# Patient Record
Sex: MALE | Race: BLACK OR AFRICAN AMERICAN | NOT HISPANIC OR LATINO
[De-identification: names, ages, dates, MRNs, and addresses within clinical notes are randomized per-mention and may not be internally consistent; named-entity substitution may affect disease eponyms.]

---

## 2017-01-05 ENCOUNTER — RX RENEWAL (OUTPATIENT)
Age: 57
End: 2017-01-05

## 2017-01-06 ENCOUNTER — APPOINTMENT (OUTPATIENT)
Dept: PULMONOLOGY | Facility: CLINIC | Age: 57
End: 2017-01-06

## 2017-01-06 VITALS
HEART RATE: 56 BPM | WEIGHT: 238 LBS | SYSTOLIC BLOOD PRESSURE: 136 MMHG | BODY MASS INDEX: 33.32 KG/M2 | HEIGHT: 71 IN | DIASTOLIC BLOOD PRESSURE: 82 MMHG | OXYGEN SATURATION: 96 % | TEMPERATURE: 98.4 F

## 2017-04-24 ENCOUNTER — APPOINTMENT (OUTPATIENT)
Dept: PULMONOLOGY | Facility: CLINIC | Age: 57
End: 2017-04-24

## 2017-04-24 VITALS
BODY MASS INDEX: 33.32 KG/M2 | WEIGHT: 238 LBS | SYSTOLIC BLOOD PRESSURE: 136 MMHG | OXYGEN SATURATION: 95 % | HEIGHT: 71 IN | HEART RATE: 60 BPM | DIASTOLIC BLOOD PRESSURE: 80 MMHG

## 2017-04-24 RX ORDER — DOXYCYCLINE 100 MG/1
100 CAPSULE ORAL
Qty: 14 | Refills: 1 | Status: DISCONTINUED | COMMUNITY
Start: 2017-01-06 | End: 2017-04-24

## 2018-01-05 ENCOUNTER — APPOINTMENT (OUTPATIENT)
Dept: PULMONOLOGY | Facility: CLINIC | Age: 58
End: 2018-01-05
Payer: COMMERCIAL

## 2018-01-05 VITALS
RESPIRATION RATE: 16 BRPM | OXYGEN SATURATION: 91 % | HEIGHT: 71 IN | HEART RATE: 78 BPM | WEIGHT: 245 LBS | DIASTOLIC BLOOD PRESSURE: 120 MMHG | BODY MASS INDEX: 34.3 KG/M2 | SYSTOLIC BLOOD PRESSURE: 156 MMHG | TEMPERATURE: 97.4 F

## 2018-01-05 VITALS
DIASTOLIC BLOOD PRESSURE: 104 MMHG | SYSTOLIC BLOOD PRESSURE: 148 MMHG | OXYGEN SATURATION: 94 % | RESPIRATION RATE: 18 BRPM | HEART RATE: 70 BPM

## 2018-01-05 DIAGNOSIS — R05 COUGH: ICD-10-CM

## 2018-01-05 PROCEDURE — 99214 OFFICE O/P EST MOD 30 MIN: CPT

## 2018-02-19 ENCOUNTER — APPOINTMENT (OUTPATIENT)
Dept: PULMONOLOGY | Facility: CLINIC | Age: 58
End: 2018-02-19
Payer: COMMERCIAL

## 2018-02-19 VITALS
TEMPERATURE: 98.6 F | HEART RATE: 64 BPM | RESPIRATION RATE: 16 BRPM | HEIGHT: 71 IN | OXYGEN SATURATION: 97 % | BODY MASS INDEX: 34.44 KG/M2 | WEIGHT: 246 LBS | DIASTOLIC BLOOD PRESSURE: 94 MMHG | SYSTOLIC BLOOD PRESSURE: 134 MMHG

## 2018-02-19 PROCEDURE — 99215 OFFICE O/P EST HI 40 MIN: CPT | Mod: 25

## 2018-02-19 PROCEDURE — 94729 DIFFUSING CAPACITY: CPT

## 2018-02-19 PROCEDURE — 94727 GAS DIL/WSHOT DETER LNG VOL: CPT

## 2018-02-19 PROCEDURE — 94060 EVALUATION OF WHEEZING: CPT

## 2018-02-19 RX ORDER — AZITHROMYCIN 250 MG/1
250 TABLET, FILM COATED ORAL
Qty: 6 | Refills: 0 | Status: COMPLETED | COMMUNITY
Start: 2017-12-23

## 2018-02-19 RX ORDER — LATANOPROST/PF 0.005 %
0.01 DROPS OPHTHALMIC (EYE)
Qty: 2 | Refills: 0 | Status: ACTIVE | COMMUNITY
Start: 2017-09-23

## 2018-02-19 RX ORDER — PREDNISONE 10 MG/1
10 TABLET ORAL
Qty: 60 | Refills: 0 | Status: COMPLETED | COMMUNITY
Start: 2017-01-06 | End: 2018-02-19

## 2018-02-19 RX ORDER — PREDNISONE 20 MG/1
20 TABLET ORAL
Qty: 10 | Refills: 0 | Status: COMPLETED | COMMUNITY
Start: 2017-12-23

## 2018-03-31 ENCOUNTER — RX RENEWAL (OUTPATIENT)
Age: 58
End: 2018-03-31

## 2018-05-09 ENCOUNTER — RX RENEWAL (OUTPATIENT)
Age: 58
End: 2018-05-09

## 2018-08-20 ENCOUNTER — APPOINTMENT (OUTPATIENT)
Dept: PULMONOLOGY | Facility: CLINIC | Age: 58
End: 2018-08-20
Payer: COMMERCIAL

## 2018-08-20 VITALS
DIASTOLIC BLOOD PRESSURE: 72 MMHG | OXYGEN SATURATION: 90 % | TEMPERATURE: 98.6 F | WEIGHT: 240 LBS | HEART RATE: 63 BPM | SYSTOLIC BLOOD PRESSURE: 116 MMHG | BODY MASS INDEX: 33.47 KG/M2

## 2018-08-20 DIAGNOSIS — J44.9 CHRONIC OBSTRUCTIVE PULMONARY DISEASE, UNSPECIFIED: ICD-10-CM

## 2018-08-20 PROCEDURE — 99214 OFFICE O/P EST MOD 30 MIN: CPT

## 2018-08-20 RX ORDER — VALSARTAN 320 MG/1
320 TABLET, COATED ORAL
Qty: 30 | Refills: 0 | Status: COMPLETED | COMMUNITY
Start: 2017-11-04 | End: 2018-08-20

## 2018-08-24 RX ORDER — AMLODIPINE BESYLATE AND BENAZEPRIL HYDROCHLORIDE 5; 20 MG/1; MG/1
5-20 CAPSULE ORAL
Qty: 30 | Refills: 0 | Status: COMPLETED | COMMUNITY
Start: 2018-01-05 | End: 2018-08-24

## 2018-08-24 RX ORDER — OLOPATADINE HYDROCHLORIDE 2 MG/ML
0.2 SOLUTION OPHTHALMIC
Qty: 2 | Refills: 0 | Status: COMPLETED | COMMUNITY
Start: 2018-05-12 | End: 2018-08-24

## 2018-09-13 ENCOUNTER — RX RENEWAL (OUTPATIENT)
Age: 58
End: 2018-09-13

## 2018-10-26 ENCOUNTER — APPOINTMENT (OUTPATIENT)
Dept: PULMONOLOGY | Facility: CLINIC | Age: 58
End: 2018-10-26
Payer: COMMERCIAL

## 2018-10-26 VITALS
BODY MASS INDEX: 34.87 KG/M2 | TEMPERATURE: 98.4 F | OXYGEN SATURATION: 93 % | RESPIRATION RATE: 12 BRPM | WEIGHT: 250 LBS | HEART RATE: 72 BPM | SYSTOLIC BLOOD PRESSURE: 118 MMHG | DIASTOLIC BLOOD PRESSURE: 86 MMHG

## 2018-10-26 PROCEDURE — 99214 OFFICE O/P EST MOD 30 MIN: CPT

## 2018-11-19 ENCOUNTER — APPOINTMENT (OUTPATIENT)
Dept: PULMONOLOGY | Facility: CLINIC | Age: 58
End: 2018-11-19

## 2019-02-04 ENCOUNTER — APPOINTMENT (OUTPATIENT)
Dept: PULMONOLOGY | Facility: CLINIC | Age: 59
End: 2019-02-04

## 2019-02-11 ENCOUNTER — APPOINTMENT (OUTPATIENT)
Dept: PULMONOLOGY | Facility: CLINIC | Age: 59
End: 2019-02-11
Payer: COMMERCIAL

## 2019-02-11 VITALS
BODY MASS INDEX: 33.47 KG/M2 | DIASTOLIC BLOOD PRESSURE: 76 MMHG | HEART RATE: 78 BPM | SYSTOLIC BLOOD PRESSURE: 106 MMHG | RESPIRATION RATE: 18 BRPM | OXYGEN SATURATION: 93 % | WEIGHT: 240 LBS

## 2019-02-11 PROCEDURE — 99214 OFFICE O/P EST MOD 30 MIN: CPT

## 2019-02-11 NOTE — PHYSICAL EXAM
[Normal Appearance] : normal appearance [General Appearance - In No Acute Distress] : no acute distress [III] : III [Neck Cervical Mass (___cm)] : no neck mass was observed [Jugular Venous Distention Increased] : there was no jugular-venous distention [Heart Sounds] : normal S1 and S2 [Murmurs] : no murmurs present [Edema] : no peripheral edema present [Auscultation Breath Sounds / Voice Sounds] : lungs were clear to auscultation bilaterally [Abdomen Soft] : soft [Abnormal Walk] : normal gait [Nail Clubbing] : no clubbing of the fingernails [Cyanosis, Localized] : no localized cyanosis [No Focal Deficits] : no focal deficits [Oriented To Time, Place, And Person] : oriented to person, place, and time [Impaired Insight] : insight and judgment were intact [Affect] : the affect was normal [] : no hepato-splenomegaly [Erythema] : no erythema of the pharynx

## 2019-02-11 NOTE — HISTORY OF PRESENT ILLNESS
[FreeTextEntry1] : He was in [facility] for an exacerbation of COPD. He is feeling better. He is coughing less.Has been using the nebulizer as needed. No shortness of breath or wheezing. No chest pain.

## 2019-02-11 NOTE — REVIEW OF SYSTEMS
[Hypertension] : ~T hypertension [Snoring] : snoring [Fatigue] : no fatigue [Nasal Congestion] : no nasal congestion [Postnasal Drip] : no postnasal drip [Cough] : no cough [Hemoptysis] : no hemoptysis [Dyspnea] : no dyspnea [Chest Tightness] : no chest tightness [Wheezing] : no wheezing [Chest Discomfort] : no chest discomfort [PND] : no PND [Palpitations] : no palpitations [Edema] : ~T edema was not present [Rash] : no [unfilled] rash [Depression] : no depression [Diabetes] : no diabetes mellitus [Thyroid Problem] : no thyroid problem [DVT] : no DVT [Difficulty Maintaining Sleep] : no difficulty maintaining sleep

## 2019-02-11 NOTE — DISCUSSION/SUMMARY
[FreeTextEntry1] : He is a 58 year-old man who smokes intermittently, with severe COPD.He is unable to use a longer acting muscarinic agent due to glaucoma. \par \par He was in [facility] last week for an exacerbation of COPD.\par \par He is stable following his exacerbation of COPD. Smoking cessation emphasized. Weight loss and exercise advised. He is back to work now. \par \par Change to Symbicort in place of Dulera (not covered). \par \par Continue albuterol via nebulizer as needed. \par \par Follow up in three months. \par \par

## 2019-03-22 ENCOUNTER — APPOINTMENT (OUTPATIENT)
Dept: PULMONOLOGY | Facility: CLINIC | Age: 59
End: 2019-03-22
Payer: COMMERCIAL

## 2019-03-22 VITALS
OXYGEN SATURATION: 88 % | HEIGHT: 71 IN | SYSTOLIC BLOOD PRESSURE: 120 MMHG | TEMPERATURE: 98.3 F | DIASTOLIC BLOOD PRESSURE: 80 MMHG | HEART RATE: 76 BPM | BODY MASS INDEX: 34.72 KG/M2 | WEIGHT: 248 LBS

## 2019-03-22 VITALS — OXYGEN SATURATION: 94 %

## 2019-03-22 DIAGNOSIS — R91.1 SOLITARY PULMONARY NODULE: ICD-10-CM

## 2019-03-22 PROCEDURE — 94618 PULMONARY STRESS TESTING: CPT

## 2019-03-22 PROCEDURE — 99215 OFFICE O/P EST HI 40 MIN: CPT | Mod: 25

## 2019-03-22 PROCEDURE — 94729 DIFFUSING CAPACITY: CPT

## 2019-03-22 PROCEDURE — 94060 EVALUATION OF WHEEZING: CPT

## 2019-03-22 PROCEDURE — 94727 GAS DIL/WSHOT DETER LNG VOL: CPT

## 2019-03-22 RX ORDER — ALBUTEROL SULFATE 2.5 MG/3ML
(2.5 MG/3ML) SOLUTION RESPIRATORY (INHALATION) EVERY 6 HOURS
Qty: 2 | Refills: 2 | Status: DISCONTINUED | COMMUNITY
Start: 2019-02-11 | End: 2019-03-22

## 2019-03-22 NOTE — PHYSICAL EXAM
[Normal Appearance] : normal appearance [General Appearance - In No Acute Distress] : no acute distress [III] : III [Neck Cervical Mass (___cm)] : no neck mass was observed [Jugular Venous Distention Increased] : there was no jugular-venous distention [Heart Sounds] : normal S1 and S2 [Murmurs] : no murmurs present [Edema] : no peripheral edema present [Auscultation Breath Sounds / Voice Sounds] : lungs were clear to auscultation bilaterally [Abdomen Soft] : soft [Abnormal Walk] : normal gait [Nail Clubbing] : no clubbing of the fingernails [Cyanosis, Localized] : no localized cyanosis [] : no rash [No Focal Deficits] : no focal deficits [Oriented To Time, Place, And Person] : oriented to person, place, and time [Impaired Insight] : insight and judgment were intact [Affect] : the affect was normal [Erythema] : no erythema of the pharynx

## 2019-03-22 NOTE — DISCUSSION/SUMMARY
[FreeTextEntry1] : He is a 58 year-old man former smoker who has very severe oxygen dependent COPD. He has been using a LAMA/ICS combination with albuterol as needed. He has been unable to use a longer acting muscarinic agent due to glaucoma. \par \par He has been hospitalized twice in 2019 for exacerbations of COPD.\par \par For the COPD he is to continue with Symbicort and albuterol as needed. He can use a nebulizer as needed.\par \par Pulse oximetry on room air upon entering the examination room was 88%. Exercise testing was attempted. Pulse oximetry decreased to 80% on room air after 2 minutes of walking. He recovered to 94% on room air after resting 3 minutes. He should be using oxygen at night while sleeping and during the daytime with any exertionPulmonary rehabilitation is also a consideration and discussed.\par \par A nodularity was noted on his recent CT as discussed above.A followup CT examination of the chest in three months was requested.\par \par I will see him in 3 months.

## 2019-03-22 NOTE — REVIEW OF SYSTEMS
[Hypertension] : ~T hypertension [Snoring] : snoring [Dyspnea] : dyspnea [Fever] : no fever [Fatigue] : no fatigue [Nasal Congestion] : no nasal congestion [Postnasal Drip] : no postnasal drip [Cough] : no cough [Hemoptysis] : no hemoptysis [Chest Tightness] : no chest tightness [Wheezing] : no wheezing [Chest Discomfort] : no chest discomfort [PND] : no PND [Palpitations] : no palpitations [Edema] : ~T edema was not present [Rash] : no [unfilled] rash [Depression] : no depression [Diabetes] : no diabetes mellitus [Thyroid Problem] : no thyroid problem [DVT] : no DVT

## 2019-03-22 NOTE — HISTORY OF PRESENT ILLNESS
[FreeTextEntry1] : He is a 58-year-old man. He has history of COPD. He was hospitalized at [facility] in March of 2019 for an exacerbation of COPD.He was also hospitalized here about one month earlier for the same problem.\par \par He is feeling better now. Coughing less. No wheezing. He has been back to work and get short of breath when walking to and from the scar. Gets short of breath when he is walking up steps. No exertional chest pain, pressure or palpitations.No constitutional symptoms.\par \par He was placed on oxygen. He has been using it as requested.He remains on Dulera. Also uses albuterol as needed. He cannot use an anticholinergic due to glaucoma.

## 2019-03-22 NOTE — PROCEDURE
[FreeTextEntry1] : Pulmonary function test performed October 28, 2016. Moderate obstructive lung disease was noted. Air trapping was present. Diffusion was moderately reduced. No significant improvement noted after inhalation of bronchodilator.\par \par Pulmonary function test performed February 19, 2018 demonstrated severe obstructive lung disease. Mild improvement noted after inhalation of bronchodilator. Air trapping was noted. Diffusion was markedly reduced.\par \par Pulmonary function test before March 22, 2019. Severe obstructive lung disease noted. FEV 1 was 0.98 L.  FEV1/FVC was 52 % of predicted. Air trapping was also present. Diffusion was moderately reduced. Mild improvement noted after inhalation of bronchodilator.\par \par A six minute walk, exercise test, was attempted. After 2 minutes of ambulation pulse oximetry decreased to 80% on room air. The test was terminated. After another three minutes of rest pulse oximetry improved up to 94% on room air. Maximal heart rate was 104 per minute.\par \par CT examination of the chest was performed at [facility] on October 15, 2018. Moderate pulmonary fibrosis was noted.Traction bronchiectasis and mild honeycombing was also present. No lymphadenopathy reported.\par \par CT examination of the chest was performed at [facility] on March 11, 2019. Ground glass opacities were noted in both lungs and most pronounced in the right middle and lower lobes. Nodular densities were also noted. The largest of which measured at 1.3 cm in the right upper lobe. Moderate emphysematous changes and fibrosis also noted.

## 2019-04-30 ENCOUNTER — RX RENEWAL (OUTPATIENT)
Age: 59
End: 2019-04-30

## 2019-05-22 ENCOUNTER — RX RENEWAL (OUTPATIENT)
Age: 59
End: 2019-05-22

## 2019-06-17 ENCOUNTER — APPOINTMENT (OUTPATIENT)
Dept: PULMONOLOGY | Facility: CLINIC | Age: 59
End: 2019-06-17

## 2019-06-22 ENCOUNTER — RX RENEWAL (OUTPATIENT)
Age: 59
End: 2019-06-22

## 2019-07-12 ENCOUNTER — APPOINTMENT (OUTPATIENT)
Dept: PULMONOLOGY | Facility: CLINIC | Age: 59
End: 2019-07-12
Payer: COMMERCIAL

## 2019-07-12 VITALS
OXYGEN SATURATION: 87 % | BODY MASS INDEX: 36.73 KG/M2 | DIASTOLIC BLOOD PRESSURE: 96 MMHG | HEART RATE: 96 BPM | WEIGHT: 248 LBS | HEIGHT: 69 IN | SYSTOLIC BLOOD PRESSURE: 130 MMHG | TEMPERATURE: 97.6 F

## 2019-07-12 VITALS — OXYGEN SATURATION: 96 %

## 2019-07-12 PROCEDURE — 99214 OFFICE O/P EST MOD 30 MIN: CPT

## 2019-07-12 NOTE — DISCUSSION/SUMMARY
[FreeTextEntry1] : He is a 58 year-old man former smoker who has very severe oxygen dependent COPD. He has been using a LABA/ICS combination with albuterol as needed. He is not able to use a long acting muscarinic agent (LAMA) due to glaucoma. He has been hospitalized three times in 2019 for exacerbations of COPD.\par \par For the COPD he is to continue with Symbicort and albuterol as needed. He can use a nebulizer as needed.\par \par Pulse oximetry was 87 % on three liter of oxygen. He is to use oxygen 24 hours per day. He should be using oxygen at night while sleeping and during the daytime. Can increase to 4-5 LPM with exertion. Pulmonary rehabilitation is also a consideration. He should also consider going on disability. \par \par He needs to provide an accurate medication list. Discussed with his wife and sister. \par \par For the leg edema he is to increase furosemide to 40 mg per day. Should monitor daily weight and keep a record of it. \par \par To see his a new PCP Dr. Dennise Berger in [place] later today.

## 2019-07-12 NOTE — HISTORY OF PRESENT ILLNESS
[FreeTextEntry1] : He is a 58 year-old man. He has history of COPD. He was hospitalized at [facility] in March of 2019 and in June for an exacerbations of his COPD.\par \par C/O GERBER. Gets short of breath when he is walking up steps and on a level surface. No exertional chest pain, pressure or palpitations.No constitutional symptoms.\par \par Does not have an accurate list of medications he is using. \par \par He has been using oxygen on and off. Now using it continuously for the past three days. \par \par Went to work yesterday.

## 2019-07-12 NOTE — PROCEDURE
[FreeTextEntry1] : Pulmonary function test performed October 28, 2016. Moderate obstructive lung disease was noted. Air trapping was present. Diffusion was moderately reduced. No significant improvement noted after inhalation of bronchodilator.\par \par Pulmonary function test performed February 19, 2018 demonstrated severe obstructive lung disease. Mild improvement noted after inhalation of bronchodilator. Air trapping was noted. Diffusion was markedly reduced.\par \par Pulmonary function test before March 22, 2019. Severe obstructive lung disease noted. FEV 1 was 0.98 L.  FEV1/FVC was 52 % of predicted. Air trapping was also present. Diffusion was moderately reduced. Mild improvement noted after inhalation of bronchodilator.\par \par 6 MWT: A six minute walk, exercise test, was attempted. After 2 minutes of ambulation pulse oximetry decreased to 80% on room air. The test was terminated. After another three minutes of rest pulse oximetry improved up to 94% on room air. Maximal heart rate was 104 per minute.\par \par CT examination of the chest was performed at [facility] on October 15, 2018. Moderate pulmonary fibrosis was noted.Traction bronchiectasis and mild honeycombing was also present. No lymphadenopathy reported.\par \par CT chest was performed at [facility] on March 11, 2019. Ground glass opacities were noted in both lungs and most pronounced in the right middle and lower lobes. Nodular densities were also noted. The largest of which measured at 1.3 cm in the right upper lobe. Moderate emphysematous changes and fibrosis also noted.\par \par CT Chest 6/22/19: Moderate pulmonary fibrosis. No significant change from November 19, 2016. Probable reactive adenopathy noted. No change from prior. \par \par

## 2019-07-12 NOTE — REVIEW OF SYSTEMS
[Dyspnea] : dyspnea [Hypertension] : ~T hypertension [Snoring] : snoring [Fatigue] : fatigue [Fever] : no fever [Nasal Congestion] : no nasal congestion [Postnasal Drip] : no postnasal drip [Cough] : no cough [Sputum] : not coughing up ~M sputum [Hemoptysis] : no hemoptysis [Chest Tightness] : no chest tightness [Wheezing] : no wheezing [Chest Discomfort] : no chest discomfort [PND] : no PND [Palpitations] : no palpitations [Edema] : ~T edema was not present [Rash] : no [unfilled] rash [Depression] : no depression [Diabetes] : no diabetes mellitus [DVT] : no DVT [Thyroid Problem] : no thyroid problem [Pulmonary Embolism] : no pulmonary embolism

## 2019-07-12 NOTE — PHYSICAL EXAM
[Normal Appearance] : normal appearance [General Appearance - In No Acute Distress] : no acute distress [III] : III [Neck Cervical Mass (___cm)] : no neck mass was observed [Jugular Venous Distention Increased] : there was no jugular-venous distention [Heart Sounds] : normal S1 and S2 [Murmurs] : no murmurs present [Edema] : no peripheral edema present [Auscultation Breath Sounds / Voice Sounds] : lungs were clear to auscultation bilaterally [Abdomen Soft] : soft [Abnormal Walk] : normal gait [Cyanosis, Localized] : no localized cyanosis [Nail Clubbing] : no clubbing of the fingernails [No Focal Deficits] : no focal deficits [] : no rash [Oriented To Time, Place, And Person] : oriented to person, place, and time [Impaired Insight] : insight and judgment were intact [Affect] : the affect was normal [3+ Pitting] : 3+  pitting  [Erythema] : no erythema of the pharynx

## 2019-07-18 ENCOUNTER — RX RENEWAL (OUTPATIENT)
Age: 59
End: 2019-07-18

## 2019-07-18 RX ORDER — FUROSEMIDE 40 MG/1
40 TABLET ORAL DAILY
Qty: 30 | Refills: 2 | Status: ACTIVE | COMMUNITY
Start: 2019-07-12 | End: 1900-01-01

## 2019-08-08 ENCOUNTER — APPOINTMENT (OUTPATIENT)
Dept: PULMONOLOGY | Facility: CLINIC | Age: 59
End: 2019-08-08

## 2019-08-15 ENCOUNTER — APPOINTMENT (OUTPATIENT)
Dept: PULMONOLOGY | Facility: CLINIC | Age: 59
End: 2019-08-15

## 2019-08-19 ENCOUNTER — APPOINTMENT (OUTPATIENT)
Dept: PULMONOLOGY | Facility: CLINIC | Age: 59
End: 2019-08-19
Payer: COMMERCIAL

## 2019-08-19 ENCOUNTER — RX RENEWAL (OUTPATIENT)
Age: 59
End: 2019-08-19

## 2019-08-19 VITALS
SYSTOLIC BLOOD PRESSURE: 150 MMHG | HEART RATE: 56 BPM | RESPIRATION RATE: 20 BRPM | BODY MASS INDEX: 36.92 KG/M2 | WEIGHT: 250 LBS | TEMPERATURE: 98.2 F | OXYGEN SATURATION: 94 % | DIASTOLIC BLOOD PRESSURE: 112 MMHG

## 2019-08-19 VITALS — OXYGEN SATURATION: 91 %

## 2019-08-19 VITALS — SYSTOLIC BLOOD PRESSURE: 160 MMHG | DIASTOLIC BLOOD PRESSURE: 112 MMHG

## 2019-08-19 PROCEDURE — 99215 OFFICE O/P EST HI 40 MIN: CPT

## 2019-08-19 RX ORDER — BENAZEPRIL HYDROCHLORIDE 40 MG/1
40 TABLET, FILM COATED ORAL
Refills: 0 | Status: COMPLETED | COMMUNITY
End: 2019-08-19

## 2019-08-19 RX ORDER — AMLODIPINE BESYLATE AND BENAZEPRIL HYDROCHLORIDE 10; 40 MG/1; MG/1
10-40 CAPSULE ORAL
Qty: 30 | Refills: 0 | Status: DISCONTINUED | COMMUNITY
Start: 2018-06-15 | End: 2019-08-19

## 2019-08-19 RX ORDER — OLMESARTAN MEDOXOMIL-HYDROCHLOROTHIAZIDE 25; 40 MG/1; MG/1
40-25 TABLET, FILM COATED ORAL
Refills: 0 | Status: ACTIVE | COMMUNITY

## 2019-08-19 RX ORDER — FUROSEMIDE 40 MG/1
40 TABLET ORAL
Refills: 0 | Status: DISCONTINUED | COMMUNITY
End: 2019-08-19

## 2019-08-19 RX ORDER — ATORVASTATIN CALCIUM 80 MG/1
80 TABLET, FILM COATED ORAL
Refills: 0 | Status: ACTIVE | COMMUNITY

## 2019-08-19 RX ORDER — CYCLOBENZAPRINE HYDROCHLORIDE 10 MG/1
10 TABLET, FILM COATED ORAL
Qty: 15 | Refills: 0 | Status: COMPLETED | COMMUNITY
Start: 2017-10-19 | End: 2019-08-19

## 2019-08-19 RX ORDER — METOPROLOL SUCCINATE 200 MG/1
TABLET, EXTENDED RELEASE ORAL
Refills: 0 | Status: COMPLETED | COMMUNITY
End: 2019-08-19

## 2019-08-19 RX ORDER — CICLOPIROX 80 MG/ML
8 SOLUTION TOPICAL
Qty: 7 | Refills: 0 | Status: COMPLETED | COMMUNITY
Start: 2017-11-16 | End: 2019-08-19

## 2019-08-19 RX ORDER — FUROSEMIDE 20 MG/1
20 TABLET ORAL
Refills: 0 | Status: DISCONTINUED | COMMUNITY
End: 2019-08-19

## 2019-08-19 RX ORDER — METOPROLOL SUCCINATE 25 MG/1
25 TABLET, EXTENDED RELEASE ORAL
Refills: 0 | Status: ACTIVE | COMMUNITY

## 2019-08-19 RX ORDER — DEXTRAN 70/HYPROMELLOSE 0.1%-0.3%
0.1-0.3 DROPS OPHTHALMIC (EYE)
Qty: 15 | Refills: 0 | Status: COMPLETED | COMMUNITY
Start: 2017-11-14 | End: 2019-08-19

## 2019-08-19 RX ORDER — SILDENAFIL 50 MG/1
50 TABLET ORAL
Qty: 5 | Refills: 0 | Status: COMPLETED | COMMUNITY
Start: 2018-05-21 | End: 2019-08-19

## 2019-08-19 RX ORDER — ATORVASTATIN CALCIUM 10 MG/1
10 TABLET, FILM COATED ORAL
Qty: 30 | Refills: 0 | Status: COMPLETED | COMMUNITY
Start: 2018-08-18 | End: 2019-08-19

## 2019-08-19 RX ORDER — MONTELUKAST 10 MG/1
10 TABLET, FILM COATED ORAL
Qty: 30 | Refills: 5 | Status: DISCONTINUED | COMMUNITY
Start: 2018-01-05 | End: 2019-08-19

## 2019-08-19 NOTE — HISTORY OF PRESENT ILLNESS
[FreeTextEntry1] : He is a 58 year-old man. He has history of COPD. He was hospitalized at [facility] in March of 2019 and in June for an exacerbations of his COPD. He is a former smoker. \par \par He gets short of breath when he is walking up steps and on a level surface. No exertional chest pain, pressure or palpitations.No constitutional symptoms.\par \par He has been using oxygen 24 hours per day now. \par \par He is still working. Has not found a new primary doctor.

## 2019-08-19 NOTE — PROCEDURE
[FreeTextEntry1] : Pulmonary function test performed October 28, 2016. Moderate obstructive lung disease was noted. Air trapping was present. Diffusion was moderately reduced. No significant improvement noted after inhalation of bronchodilator.\par \par Pulmonary function test performed February 19, 2018 demonstrated severe obstructive lung disease. Mild improvement noted after inhalation of bronchodilator. Air trapping was noted. Diffusion was markedly reduced.\par \par Pulmonary function test March 22, 2019:  Severe obstructive lung disease noted. FEV 1 was 0.98 L.  FEV 1/FVC was 52 % of predicted.  Air trapping was also present. Diffusion was moderately reduced. Mild improvement noted after inhalation of bronchodilator.\par \par 6 MWT 3/22/19:  A six minute walk, exercise test, was attempted. After 2 minutes of ambulation pulse oximetry decreased to 80% on room air. The test was terminated. After another three minutes of rest pulse oximetry improved up to 94% on room air. Maximal heart rate was 104 per minute.\par \par CT examination of the chest was performed at [facility] on October 15, 2018. Moderate pulmonary fibrosis was noted.Traction bronchiectasis and mild honeycombing was also present. No lymphadenopathy reported.\par \par CT chest was performed at [facility] on March 11, 2019. Ground glass opacities were noted in both lungs and most pronounced in the right middle and lower lobes. Nodular densities were also noted. The largest of which measured at 1.3 cm in the right upper lobe. Moderate emphysematous changes and fibrosis also noted.\par \par CT Chest 6/22/19: Moderate pulmonary fibrosis. No significant change from November 19, 2016. Probable reactive adenopathy noted. No change from prior. \par \par Cardiac catheterization was performed June 25, 2019. He had a right dominant coronary artery system. No apparent coronary artery disease. Hyperdynamic left ventricular contraction was noted.

## 2019-08-19 NOTE — REVIEW OF SYSTEMS
[Fatigue] : fatigue [Dyspnea] : dyspnea [Hypertension] : ~T hypertension [Snoring] : snoring [Postnasal Drip] : no postnasal drip [Nasal Congestion] : no nasal congestion [Cough] : no cough [Sputum] : not coughing up ~M sputum [Hemoptysis] : no hemoptysis [Chest Tightness] : no chest tightness [Wheezing] : no wheezing [Chest Discomfort] : no chest discomfort [PND] : no PND [Palpitations] : no palpitations [Edema] : ~T edema was not present [Depression] : no depression [Rash] : no [unfilled] rash [Diabetes] : no diabetes mellitus [Thyroid Problem] : no thyroid problem [DVT] : no DVT [Pulmonary Embolism] : no pulmonary embolism

## 2019-08-19 NOTE — PHYSICAL EXAM
[General Appearance - In No Acute Distress] : no acute distress [III] : III [Neck Cervical Mass (___cm)] : no neck mass was observed [Heart Sounds] : normal S1 and S2 [Murmurs] : no murmurs present [Edema] : no peripheral edema present [Auscultation Breath Sounds / Voice Sounds] : lungs were clear to auscultation bilaterally [Abdomen Soft] : soft [Abnormal Walk] : normal gait [Nail Clubbing] : no clubbing of the fingernails [Cyanosis, Localized] : no localized cyanosis [3+ Pitting] : 3+  pitting  [No Focal Deficits] : no focal deficits [] : no rash [Oriented To Time, Place, And Person] : oriented to person, place, and time [Impaired Insight] : insight and judgment were intact [Affect] : the affect was normal [Erythema] : no erythema of the pharynx

## 2019-08-19 NOTE — DISCUSSION/SUMMARY
[FreeTextEntry1] : He is a 58 year-old man former smoker who has very severe oxygen dependent COPD. He has been using a LABA/ICS combination with albuterol as needed. He is not able to use a long acting muscarinic agent (LAMA) due to glaucoma. He has been hospitalized three times in 2019 for exacerbations of COPD.\par \par For the COPD he is to continue with oxygen, Symbicort and albuterol as needed. He can use a nebulizer as needed.\par \par Pulse oximetry was 87 % on three liter of oxygen. He is to use oxygen 24 hours per day. He should be using oxygen at night while sleeping and during the daytime. Can increase to 4-5 LPM with exertion. Pulmonary rehabilitation is also a consideration. He should also consider going on disability. \par \par Continues to have leg edema. To continue with furosemide to 40 mg per day. Should monitor daily weight and keep a record of it. Maintain a low salt diet. Should follow up with Cardiology and Primary Care. \par \par For his COPD will try to get rofumilast. \par \par Advised to be evaluated by lung transplant program. Discussed with his sister who was present.

## 2019-09-26 ENCOUNTER — APPOINTMENT (OUTPATIENT)
Dept: CARDIOLOGY | Facility: CLINIC | Age: 59
End: 2019-09-26

## 2019-11-21 ENCOUNTER — APPOINTMENT (OUTPATIENT)
Dept: PULMONOLOGY | Facility: CLINIC | Age: 59
End: 2019-11-21
Payer: COMMERCIAL

## 2019-11-21 VITALS
WEIGHT: 248 LBS | RESPIRATION RATE: 16 BRPM | OXYGEN SATURATION: 92 % | BODY MASS INDEX: 36.73 KG/M2 | SYSTOLIC BLOOD PRESSURE: 160 MMHG | HEIGHT: 69 IN | DIASTOLIC BLOOD PRESSURE: 80 MMHG | HEART RATE: 57 BPM

## 2019-11-21 VITALS — OXYGEN SATURATION: 97 %

## 2019-11-21 PROCEDURE — 99214 OFFICE O/P EST MOD 30 MIN: CPT

## 2019-11-21 NOTE — PHYSICAL EXAM
[Normal Appearance] : normal appearance [General Appearance - In No Acute Distress] : no acute distress [III] : III [Neck Cervical Mass (___cm)] : no neck mass was observed [Jugular Venous Distention Increased] : there was no jugular-venous distention [Heart Sounds] : normal S1 and S2 [Murmurs] : no murmurs present [Edema] : no peripheral edema present [Auscultation Breath Sounds / Voice Sounds] : lungs were clear to auscultation bilaterally [Abdomen Soft] : soft [Abnormal Walk] : normal gait [Nail Clubbing] : no clubbing of the fingernails [Cyanosis, Localized] : no localized cyanosis [3+ Pitting] : 3+  pitting  [] : no rash [No Focal Deficits] : no focal deficits [Oriented To Time, Place, And Person] : oriented to person, place, and time [Impaired Insight] : insight and judgment were intact [Affect] : the affect was normal [Skin Turgor] : normal skin turgor [Erythema] : no erythema of the pharynx

## 2019-11-21 NOTE — HISTORY OF PRESENT ILLNESS
[FreeTextEntry1] : He is a 59 year-old man. He has history of COPD. He was hospitalized at [facility] in March of 2019 and in June for an exacerbations of his COPD.\par \par Continues to complain of dyspnea on exertion. He gets short of breath when he is walking up steps and on a level surface. No exertional chest pain, pressure or palpitations.He has been using oxygen on and off. He is still working. \par \par He was seen at the [employer] compensation board. \par \par He was evaluated at [facility] for the transplant program. He saw Dr. Stevenson.

## 2019-11-21 NOTE — DISCUSSION/SUMMARY
[FreeTextEntry1] : He is a 59 year-old man former smoker who has very severe oxygen dependent COPD. He has been using a LABA/ICS combination with albuterol as needed. He is not able to use a long acting muscarinic agent (LAMA) due to glaucoma. He has been hospitalized three times in 2019 for exacerbations of COPD.\par \par For the COPD he is to continue with Symbicort and albuterol as needed. He can use a nebulizer as needed. Pulmonary rehab. suggested. Advised to follow up with [place] transplant program although the surgery concerns him. \par \par Follow up in three months. Sooner if needed.

## 2019-11-21 NOTE — HISTORY OF PRESENT ILLNESS
[FreeTextEntry1] : He is a 59 year-old man. He has history of COPD. He was hospitalized at [facility] in March of 2019 and in June for an exacerbations of his COPD.\par \par Continues to complain of dyspnea on exertion. He gets short of breath when he is walking up steps and on a level surface. No exertional chest pain, pressure or palpitations.He has been using oxygen on and off. He is still working. \par \par He was seen at the [employer] compensation board. \par \par He was evaluated at [place] for the transplant program. He saw Dr. Stevenson.

## 2019-11-21 NOTE — DISCUSSION/SUMMARY
[FreeTextEntry1] : He is a 59 year-old man former smoker who has very severe oxygen dependent COPD. He has been using a LABA/ICS combination with albuterol as needed. He is not able to use a long acting muscarinic agent (LAMA) due to glaucoma. He has been hospitalized three times in 2019 for exacerbations of COPD.\par \par For the COPD he is to continue with Symbicort and albuterol as needed. He can use a nebulizer as needed. Pulmonary rehab. suggested. Advised to follow up with [place] transplant program although the surgery concerns him. \par \par Follow up in three months. Sooner if needed.

## 2019-11-21 NOTE — REVIEW OF SYSTEMS
[Fatigue] : fatigue [Dyspnea] : dyspnea [Hypertension] : ~T hypertension [Snoring] : snoring [Fever] : no fever [Nasal Congestion] : no nasal congestion [Postnasal Drip] : no postnasal drip [Cough] : no cough [Sputum] : not coughing up ~M sputum [Hemoptysis] : no hemoptysis [Chest Tightness] : no chest tightness [Wheezing] : no wheezing [Chest Discomfort] : no chest discomfort [PND] : no PND [Palpitations] : no palpitations [Edema] : ~T edema was not present [Rash] : no [unfilled] rash [Depression] : no depression [Diabetes] : no diabetes mellitus [Thyroid Problem] : no thyroid problem [DVT] : no DVT [Pulmonary Embolism] : no pulmonary embolism

## 2019-11-21 NOTE — PROCEDURE
[FreeTextEntry1] : PFT 10/28/16: Moderate obstructive lung disease was noted. Air trapping was present. Diffusion was moderately reduced. No significant improvement noted after inhalation of bronchodilator.\par \par PFT 2/19/18: Severe obstructive lung disease. Mild improvement noted after inhalation of bronchodilator. Air trapping was noted. Diffusion was markedly reduced.\par \par PFT 3/22/19: Severe obstructive lung disease noted. FEV 1 was 0.98 L.  FEV1/FVC was 52 % of predicted. Air trapping was also present. Diffusion was moderately reduced. Mild improvement noted after inhalation of bronchodilator.\par \par 6 MWT: A six minute walk, exercise test, was attempted. After 2 minutes of ambulation pulse oximetry decreased to 80% on room air. The test was terminated. After another three minutes of rest pulse oximetry improved up to 94% on room air. Maximal heart rate was 104 per minute.\par \par CT Chest 10/15/18: Moderate pulmonary fibrosis was noted.Traction bronchiectasis and mild honeycombing was also present. No lymphadenopathy reported.\par \par CT Chest 3/11/19: Ground glass opacities were noted in both lungs and most pronounced in the right middle and lower lobes. Nodular densities were also noted. The largest of which measured at 1.3 cm in the right upper lobe. Moderate emphysematous changes and fibrosis also noted.\par \par CT Chest 6/22/19: Moderate pulmonary fibrosis. No significant change from November 19, 2016. Probable reactive adenopathy noted. No change from prior.

## 2020-01-30 ENCOUNTER — RX RENEWAL (OUTPATIENT)
Age: 60
End: 2020-01-30

## 2020-02-05 ENCOUNTER — APPOINTMENT (OUTPATIENT)
Dept: PULMONOLOGY | Facility: CLINIC | Age: 60
End: 2020-02-05
Payer: COMMERCIAL

## 2020-02-05 VITALS
WEIGHT: 245 LBS | DIASTOLIC BLOOD PRESSURE: 62 MMHG | BODY MASS INDEX: 36.18 KG/M2 | HEART RATE: 65 BPM | OXYGEN SATURATION: 94 % | TEMPERATURE: 98 F | SYSTOLIC BLOOD PRESSURE: 112 MMHG

## 2020-02-05 VITALS — OXYGEN SATURATION: 94 % | HEART RATE: 67 BPM

## 2020-02-05 PROCEDURE — 99215 OFFICE O/P EST HI 40 MIN: CPT

## 2020-02-05 PROCEDURE — 99204 OFFICE O/P NEW MOD 45 MIN: CPT

## 2020-02-05 NOTE — DISCUSSION/SUMMARY
[FreeTextEntry1] : He is a 59 year-old man former smoker who has very severe oxygen dependent COPD. He has been using a LABA/ICS combination, Symbicort, with albuterol as needed. He is not able to use a long acting muscarinic agent (LAMA) due to glaucoma. He had a problem when one was tried in the past. He has been hospitalized three times in 2019 for exacerbations of COPD.\par \par For the COPD he is to continue with Symbicort and albuterol as needed. Budesonide via the nebulizer was added. He can use albuterol prn via nebulizer.\par \par Given his worsening symptoms and potential that he may get worse before he gets better he was advised not to travel on the cruise as previously planned. \par \par Follow up in one month.

## 2020-02-05 NOTE — REASON FOR VISIT
[Follow-Up] : a follow-up visit [Acute] : an acute visit [COPD] : COPD [Shortness of Breath] : shortness of breath

## 2020-02-05 NOTE — REVIEW OF SYSTEMS
[Fatigue] : fatigue [Dyspnea] : dyspnea [Hypertension] : ~T hypertension [Snoring] : snoring [Fever] : no fever [Nasal Congestion] : no nasal congestion [Postnasal Drip] : no postnasal drip [Cough] : no cough [Sputum] : not coughing up ~M sputum [Hemoptysis] : no hemoptysis [Chest Tightness] : no chest tightness [Chest Discomfort] : no chest discomfort [Wheezing] : no wheezing [Edema] : ~T edema was not present [PND] : no PND [Palpitations] : no palpitations [Rash] : no [unfilled] rash [Depression] : no depression [Diabetes] : no diabetes mellitus [Thyroid Problem] : no thyroid problem [Pulmonary Embolism] : no pulmonary embolism [DVT] : no DVT

## 2020-02-05 NOTE — PHYSICAL EXAM
[III] : III [Neck Cervical Mass (___cm)] : no neck mass was observed [Heart Sounds] : normal S1 and S2 [Murmurs] : no murmurs present [Edema] : no peripheral edema present [Abdomen Soft] : soft [Abnormal Walk] : normal gait [Nail Clubbing] : no clubbing of the fingernails [Cyanosis, Localized] : no localized cyanosis [3+ Pitting] : 3+  pitting  [Skin Turgor] : normal skin turgor [No Focal Deficits] : no focal deficits [Oriented To Time, Place, And Person] : oriented to person, place, and time [Affect] : the affect was normal [Impaired Insight] : insight and judgment were intact [Well Groomed] : well groomed [] : no respiratory distress [Erythema] : no erythema of the pharynx [FreeTextEntry1] : Prolonged expiratory phase but no active wheezing. No rhonchi.

## 2020-02-05 NOTE — HISTORY OF PRESENT ILLNESS
[FreeTextEntry1] : He is a 59 year-old man. He has history of COPD. He was hospitalized at [facility] in March of 2019 and in June for an exacerbations of his COPD.\par \par has been having increased dyspnea on exertion. Has more cough and chest congestion. No fever or chills. No sick contacts. No travel. Continues to work. No exertional chest pain, pressure or palpitations.He has been using oxygen on and off. \par \par He is due to go on a cruise leaving on 2/7. \par \par He was evaluated at [place] for the transplant program. He does not want to participate.

## 2020-03-13 ENCOUNTER — APPOINTMENT (OUTPATIENT)
Dept: PULMONOLOGY | Facility: CLINIC | Age: 60
End: 2020-03-13

## 2020-05-01 ENCOUNTER — APPOINTMENT (OUTPATIENT)
Dept: PULMONOLOGY | Facility: CLINIC | Age: 60
End: 2020-05-01

## 2020-06-04 ENCOUNTER — APPOINTMENT (OUTPATIENT)
Dept: PULMONOLOGY | Facility: CLINIC | Age: 60
End: 2020-06-04

## 2020-06-29 ENCOUNTER — APPOINTMENT (OUTPATIENT)
Dept: PULMONOLOGY | Facility: CLINIC | Age: 60
End: 2020-06-29
Payer: COMMERCIAL

## 2020-06-29 VITALS
OXYGEN SATURATION: 98 % | SYSTOLIC BLOOD PRESSURE: 116 MMHG | DIASTOLIC BLOOD PRESSURE: 72 MMHG | HEART RATE: 52 BPM | WEIGHT: 260 LBS | BODY MASS INDEX: 38.4 KG/M2 | TEMPERATURE: 98.3 F

## 2020-06-29 PROCEDURE — 99214 OFFICE O/P EST MOD 30 MIN: CPT

## 2020-06-29 NOTE — DISCUSSION/SUMMARY
[FreeTextEntry1] : He is a 59 year-old former smoker who has very severe oxygen dependent COPD and pulmonary fibrosis. He has been using a LABA/ICS combination, Symbicort, with albuterol as needed. He is not able to use a LAMA due to glaucoma. He had a problem when one was tried in the past. He has been hospitalized three times in 2019 for exacerbations of COPD.\par \par His COPD is stable. He is to continue with Symbicoert and albuterol as needed. Also has albuterol as needed via nebulizer.\par \par Should follow up with his current PCP, Dr. Angel Carroll. \par \par Follow up in two months.

## 2020-06-29 NOTE — PHYSICAL EXAM
[III] : III [Heart Sounds] : normal S1 and S2 [Neck Cervical Mass (___cm)] : no neck mass was observed [Murmurs] : no murmurs present [Edema] : no peripheral edema present [Abdomen Soft] : soft [Abnormal Walk] : normal gait [Cyanosis, Localized] : no localized cyanosis [Nail Clubbing] : no clubbing of the fingernails [3+ Pitting] : 3+  pitting  [] : no rash [Skin Turgor] : normal skin turgor [No Focal Deficits] : no focal deficits [Oriented To Time, Place, And Person] : oriented to person, place, and time [Affect] : the affect was normal [General Appearance - In No Acute Distress] : no acute distress [Erythema] : no erythema of the pharynx [FreeTextEntry1] : No wheezing

## 2020-06-29 NOTE — REASON FOR VISIT
[Shortness of Breath] : shortness of breath [Acute] : an acute visit [Follow-Up] : a follow-up visit [COPD] : COPD

## 2020-06-29 NOTE — HISTORY OF PRESENT ILLNESS
[Former] : former [FreeTextEntry1] : He is a 59 year-old man. He has history of COPD. He was hospitalized at [facility] in March of 2019 and in June for an exacerbations of his COPD.\par \par He has dyspnea on exertion as before. No chronic cough and chest congestion. No fever or chills. No sick contacts. He continues to work. Does not want to sit at home.  \par \par He was evaluated at [place] for the transplant program. He does not want to participate.

## 2020-06-29 NOTE — REVIEW OF SYSTEMS
[Fatigue] : fatigue [Dyspnea] : dyspnea [Hypertension] : ~T hypertension [Snoring] : snoring [Nasal Congestion] : no nasal congestion [Fever] : no fever [Cough] : no cough [Postnasal Drip] : no postnasal drip [Hemoptysis] : no hemoptysis [Chest Tightness] : no chest tightness [Wheezing] : no wheezing [Chest Discomfort] : no chest discomfort [PND] : no PND [Edema] : ~T edema was not present [Rash] : no [unfilled] rash [Depression] : no depression [Diabetes] : no diabetes mellitus [Thyroid Problem] : no thyroid problem [DVT] : no DVT [Pulmonary Embolism] : no pulmonary embolism

## 2020-07-06 ENCOUNTER — RX RENEWAL (OUTPATIENT)
Age: 60
End: 2020-07-06

## 2020-08-11 ENCOUNTER — RX RENEWAL (OUTPATIENT)
Age: 60
End: 2020-08-11

## 2020-08-24 ENCOUNTER — APPOINTMENT (OUTPATIENT)
Dept: PULMONOLOGY | Facility: CLINIC | Age: 60
End: 2020-08-24
Payer: COMMERCIAL

## 2020-08-24 VITALS
TEMPERATURE: 98.8 F | SYSTOLIC BLOOD PRESSURE: 128 MMHG | OXYGEN SATURATION: 91 % | WEIGHT: 260 LBS | HEART RATE: 57 BPM | DIASTOLIC BLOOD PRESSURE: 72 MMHG | BODY MASS INDEX: 38.4 KG/M2

## 2020-08-24 VITALS — OXYGEN SATURATION: 99 %

## 2020-08-24 PROCEDURE — 99215 OFFICE O/P EST HI 40 MIN: CPT

## 2020-08-24 NOTE — DISCUSSION/SUMMARY
[FreeTextEntry1] : He is a 59 year-old former smoker who has severe oxygen dependent COPD and pulmonary fibrosis. He has been using a LABA/ICS combination, Symbicort, with albuterol as needed. He is not able to use a LAMA due to glaucoma. He had a problem when one was tried in the past. He has been hospitalized three times in 2019 for exacerbations of COPD. Evaluation for lung transplant was suggested in the past. He chose not to proceed with this. Cardiac cath 6/25/19 - no CAD. EF 75 %. \par \par His COPD is stable. He is to continue with Symbicort and albuterol as needed. Also has albuterol as needed via nebulizer. To maintain on oxygen as well. \par \par Scheduled to have colonoscopy in the near future. Has had colonic polyps. He tolerated sedation for the cardiac cath. Will need the assistance of anesthesiology for the procedure. May need to be ventilated temporarily with either bilevel or an LMA. \par

## 2020-08-24 NOTE — HISTORY OF PRESENT ILLNESS
[Former] : former [FreeTextEntry1] : He is a 59 year-old man. He has history of COPD. He was hospitalized at [facility] in March of 2019 and in June for an exacerbations of his COPD. He has been evaluated at [place] for the transplant program. He does not want to participate. \par \par C/O dyspnea on exertion as before. No chronic cough and chest congestion. No fever or chills. No sick contacts. He continues to work. \par \par

## 2020-08-24 NOTE — REVIEW OF SYSTEMS
[Fatigue] : fatigue [Dyspnea] : dyspnea [Hypertension] : ~T hypertension [Snoring] : snoring [Fever] : no fever [Nasal Congestion] : no nasal congestion [Postnasal Drip] : no postnasal drip [Cough] : no cough [Hemoptysis] : no hemoptysis [Chest Tightness] : no chest tightness [Wheezing] : no wheezing [Chest Discomfort] : no chest discomfort [PND] : no PND [Edema] : ~T edema was not present [Rash] : no [unfilled] rash [Depression] : no depression [Thyroid Problem] : no thyroid problem [Diabetes] : no diabetes mellitus [DVT] : no DVT [Pulmonary Embolism] : no pulmonary embolism

## 2020-08-24 NOTE — REASON FOR VISIT
[Acute] : an acute visit [Shortness of Breath] : shortness of breath [Follow-Up] : a follow-up visit [COPD] : COPD

## 2020-09-07 NOTE — PHYSICAL EXAM
[General Appearance - In No Acute Distress] : no acute distress [Heart Sounds] : normal S1 and S2 [Neck Cervical Mass (___cm)] : no neck mass was observed [Murmurs] : no murmurs present [Edema] : no peripheral edema present [Nail Clubbing] : no clubbing of the fingernails [Abnormal Walk] : normal gait [3+ Pitting] : 3+  pitting  [Skin Turgor] : normal skin turgor [Cyanosis, Localized] : no localized cyanosis [No Focal Deficits] : no focal deficits [] : no rash [Affect] : the affect was normal [Oriented To Time, Place, And Person] : oriented to person, place, and time [Normal Oropharynx] : normal oropharynx [Auscultation Breath Sounds / Voice Sounds] : lungs were clear to auscultation bilaterally [Erythema] : no erythema of the pharynx [FreeTextEntry1] : Obese abdomen 07-Sep-2020 18:52

## 2020-11-23 ENCOUNTER — APPOINTMENT (OUTPATIENT)
Dept: PULMONOLOGY | Facility: CLINIC | Age: 60
End: 2020-11-23
Payer: COMMERCIAL

## 2020-11-23 VITALS
TEMPERATURE: 98 F | RESPIRATION RATE: 17 BRPM | BODY MASS INDEX: 37.51 KG/M2 | HEART RATE: 62 BPM | SYSTOLIC BLOOD PRESSURE: 103 MMHG | DIASTOLIC BLOOD PRESSURE: 80 MMHG | WEIGHT: 254 LBS | OXYGEN SATURATION: 96 %

## 2020-11-23 VITALS — OXYGEN SATURATION: 99 %

## 2020-11-23 PROCEDURE — 99214 OFFICE O/P EST MOD 30 MIN: CPT

## 2020-11-23 NOTE — HISTORY OF PRESENT ILLNESS
[Former] : former [TextBox_4] : The patient came for a follow up visit today. \par \par Using oxygen 24 hr /day. \par \par Has GERBER as before. No chronic cough and chest congestion. No fever or chills. No sick contacts. He continues to work. \par \par Received influenza vaccination elsewhere. \par  [FreeTextEntry1] : \par

## 2020-11-23 NOTE — DISCUSSION/SUMMARY
[FreeTextEntry1] : He is a 60 year-old former smoker who has severe oxygen dependent COPD and pulmonary fibrosis. He has been using a LABA/ICS combination, Symbicort, with albuterol as needed. He is not able to use a LAMA due to glaucoma. He had a problem when one was tried in the past. He has been hospitalized three times in 2019 for exacerbations of COPD. Evaluation for lung transplant was suggested in the past. He chose not to proceed with this. Cardiac cath 6/25/19 - no CAD. EF 75 %. \par \par His COPD remains stable. \par \par He is to continue with Symbicort and albuterol as needed. Cannot use LAMA. Also has albuterol as needed via nebulizer. To maintain on oxygen as well. \par \par Follow up in 6 months. Sooner if necessary. \par

## 2020-11-23 NOTE — REVIEW OF SYSTEMS
[Fatigue] : fatigue [Dyspnea] : dyspnea [Hypertension] : ~T hypertension [Snoring] : snoring [Fever] : no fever [Cough] : no cough [Hemoptysis] : no hemoptysis [Chest Tightness] : no chest tightness [Wheezing] : no wheezing [Chest Discomfort] : no chest discomfort [Edema] : ~T edema was not present [Heartburn] : no heartburn [Rash] : no [unfilled] rash [Depression] : no depression [Diabetes] : no diabetes mellitus [Thyroid Problem] : no thyroid problem [DVT] : no DVT [Pulmonary Embolism] : no pulmonary embolism

## 2020-11-23 NOTE — PHYSICAL EXAM
[General Appearance - In No Acute Distress] : no acute distress [Neck Cervical Mass (___cm)] : no neck mass was observed [Heart Sounds] : normal S1 and S2 [Murmurs] : no murmurs present [Edema] : no peripheral edema present [Auscultation Breath Sounds / Voice Sounds] : lungs were clear to auscultation bilaterally [] : no hepato-splenomegaly [Abnormal Walk] : normal gait [Cyanosis, Localized] : no localized cyanosis [3+ Pitting] : 3+  pitting  [Skin Turgor] : normal skin turgor [No Focal Deficits] : no focal deficits [Oriented To Time, Place, And Person] : oriented to person, place, and time [Affect] : the affect was normal [III] : III [FreeTextEntry1] : Obese abdomen

## 2021-01-21 ENCOUNTER — NON-APPOINTMENT (OUTPATIENT)
Age: 61
End: 2021-01-21

## 2021-01-21 ENCOUNTER — APPOINTMENT (OUTPATIENT)
Dept: CARDIOLOGY | Facility: CLINIC | Age: 61
End: 2021-01-21
Payer: COMMERCIAL

## 2021-01-21 VITALS
HEIGHT: 69 IN | DIASTOLIC BLOOD PRESSURE: 97 MMHG | SYSTOLIC BLOOD PRESSURE: 153 MMHG | WEIGHT: 268 LBS | HEART RATE: 60 BPM | BODY MASS INDEX: 39.69 KG/M2 | RESPIRATION RATE: 16 BRPM | TEMPERATURE: 97.3 F | OXYGEN SATURATION: 96 %

## 2021-01-21 DIAGNOSIS — E78.5 HYPERLIPIDEMIA, UNSPECIFIED: ICD-10-CM

## 2021-01-21 DIAGNOSIS — I10 ESSENTIAL (PRIMARY) HYPERTENSION: ICD-10-CM

## 2021-01-21 DIAGNOSIS — R60.0 LOCALIZED EDEMA: ICD-10-CM

## 2021-01-21 PROCEDURE — 99244 OFF/OP CNSLTJ NEW/EST MOD 40: CPT

## 2021-01-21 PROCEDURE — 93000 ELECTROCARDIOGRAM COMPLETE: CPT

## 2021-01-21 PROCEDURE — 99072 ADDL SUPL MATRL&STAF TM PHE: CPT

## 2021-01-22 PROBLEM — E78.5 HYPERLIPIDEMIA: Status: ACTIVE | Noted: 2021-01-22

## 2021-01-22 PROBLEM — R60.0 BILATERAL LEG EDEMA: Status: ACTIVE | Noted: 2021-01-22

## 2021-01-22 NOTE — HISTORY OF PRESENT ILLNESS
[FreeTextEntry1] : Guevara is a 60-year-old gentleman ex-smoker, COPD and pulmonary fibrosis on home oxygen, hypertension, hyperlipidemia who presents with increase lower extremity edema. He is on lasix daily without significant improvement. He works and is on his feet a lot. The last two weeks the edema was worse every day but today not bad. Here with his wife.

## 2021-01-22 NOTE — DISCUSSION/SUMMARY
[___ Month(s)] : [unfilled] month(s) [FreeTextEntry1] : The patient is a 60-year-old gentleman ex-smoker COPD, pulmonary fibrosis on oxygen, hypertension, hyperlipidemia with bilateral lower extremity edema that is intermittent.\par #1 CV- normal CORS 6/2019, recommend ECHO with PA pressure, continue current lasix daily and prn second dose with increase edema, compression stockings daily, if neg then venous doppler \par #2 Htn- diltiazem, benicar/hctz,?metoprolol, consider spironolactone\par #3 LIpids- atorvastatin\par #4 Pulm- oxygen ATC, proair, symbicort, albuterol

## 2021-01-22 NOTE — PHYSICAL EXAM
[General Appearance - Well Developed] : well developed [Normal Appearance] : normal appearance [Well Groomed] : well groomed [General Appearance - Well Nourished] : well nourished [No Deformities] : no deformities [General Appearance - In No Acute Distress] : no acute distress [Normal Conjunctiva] : the conjunctiva exhibited no abnormalities [Eyelids - No Xanthelasma] : the eyelids demonstrated no xanthelasmas [Normal Oral Mucosa] : normal oral mucosa [No Oral Cyanosis] : no oral cyanosis [No Oral Pallor] : no oral pallor [Normal Jugular Venous A Waves Present] : normal jugular venous A waves present [Normal Jugular Venous V Waves Present] : normal jugular venous V waves present [No Jugular Venous Rivera A Waves] : no jugular venous rivera A waves [Heart Rate And Rhythm] : heart rate and rhythm were normal [Heart Sounds] : normal S1 and S2 [Murmurs] : no murmurs present [Respiration, Rhythm And Depth] : normal respiratory rhythm and effort [Exaggerated Use Of Accessory Muscles For Inspiration] : no accessory muscle use [Auscultation Breath Sounds / Voice Sounds] : lungs were clear to auscultation bilaterally [Abdomen Soft] : soft [Abdomen Tenderness] : non-tender [Abdomen Mass (___ Cm)] : no abdominal mass palpated [Abnormal Walk] : normal gait [Gait - Sufficient For Exercise Testing] : the gait was sufficient for exercise testing [Nail Clubbing] : no clubbing of the fingernails [Cyanosis, Localized] : no localized cyanosis [Petechial Hemorrhages (___cm)] : no petechial hemorrhages [FreeTextEntry1] : nonpitting edema L>R [Skin Color & Pigmentation] : normal skin color and pigmentation [] : no rash [No Venous Stasis] : no venous stasis [Skin Lesions] : no skin lesions [No Skin Ulcers] : no skin ulcer [No Xanthoma] : no  xanthoma was observed [Oriented To Time, Place, And Person] : oriented to person, place, and time [Affect] : the affect was normal [Mood] : the mood was normal [No Anxiety] : not feeling anxious

## 2021-01-28 ENCOUNTER — APPOINTMENT (OUTPATIENT)
Dept: CARDIOLOGY | Facility: CLINIC | Age: 61
End: 2021-01-28
Payer: COMMERCIAL

## 2021-01-28 PROCEDURE — 99072 ADDL SUPL MATRL&STAF TM PHE: CPT

## 2021-01-28 PROCEDURE — 93306 TTE W/DOPPLER COMPLETE: CPT

## 2021-02-04 ENCOUNTER — APPOINTMENT (OUTPATIENT)
Dept: CARDIOLOGY | Facility: CLINIC | Age: 61
End: 2021-02-04

## 2021-02-19 ENCOUNTER — APPOINTMENT (OUTPATIENT)
Dept: PULMONOLOGY | Facility: CLINIC | Age: 61
End: 2021-02-19

## 2021-02-19 ENCOUNTER — RX RENEWAL (OUTPATIENT)
Age: 61
End: 2021-02-19

## 2021-05-07 ENCOUNTER — RX RENEWAL (OUTPATIENT)
Age: 61
End: 2021-05-07

## 2021-05-07 ENCOUNTER — APPOINTMENT (OUTPATIENT)
Dept: PULMONOLOGY | Facility: CLINIC | Age: 61
End: 2021-05-07

## 2021-05-19 ENCOUNTER — RX RENEWAL (OUTPATIENT)
Age: 61
End: 2021-05-19

## 2021-05-24 ENCOUNTER — APPOINTMENT (OUTPATIENT)
Dept: PULMONOLOGY | Facility: CLINIC | Age: 61
End: 2021-05-24

## 2021-06-03 ENCOUNTER — APPOINTMENT (OUTPATIENT)
Dept: PULMONOLOGY | Facility: CLINIC | Age: 61
End: 2021-06-03
Payer: COMMERCIAL

## 2021-06-03 VITALS
HEIGHT: 69 IN | DIASTOLIC BLOOD PRESSURE: 81 MMHG | HEART RATE: 55 BPM | WEIGHT: 268 LBS | RESPIRATION RATE: 16 BRPM | OXYGEN SATURATION: 98 % | TEMPERATURE: 97 F | SYSTOLIC BLOOD PRESSURE: 122 MMHG | BODY MASS INDEX: 39.69 KG/M2

## 2021-06-03 PROCEDURE — 99213 OFFICE O/P EST LOW 20 MIN: CPT

## 2021-06-03 PROCEDURE — 99072 ADDL SUPL MATRL&STAF TM PHE: CPT

## 2021-06-03 NOTE — PHYSICAL EXAM
[General Appearance - In No Acute Distress] : no acute distress [III] : III [Neck Cervical Mass (___cm)] : no neck mass was observed [Heart Sounds] : normal S1 and S2 [Murmurs] : no murmurs present [Edema] : no peripheral edema present [Auscultation Breath Sounds / Voice Sounds] : lungs were clear to auscultation bilaterally [] : no hepato-splenomegaly [Abnormal Walk] : normal gait [Cyanosis, Localized] : no localized cyanosis [3+ Pitting] : 3+  pitting  [Skin Turgor] : normal skin turgor [No Focal Deficits] : no focal deficits [Oriented To Time, Place, And Person] : oriented to person, place, and time [Affect] : the affect was normal [FreeTextEntry1] : Obese abdomen

## 2021-06-03 NOTE — DISCUSSION/SUMMARY
[FreeTextEntry1] : He is a 60 year-old former smoker who has severe oxygen dependent COPD and pulmonary fibrosis. He has been using a LABA/ICS combination, Symbicort, with albuterol as needed. He is not able to use a LAMA due to glaucoma. He had a problem when one was tried in the past. He has been hospitalized three times in 2019 for exacerbations of COPD. Evaluation for lung transplant was suggested in the past. He chose not to proceed with this. Cardiac cath 6/25/19 - no CAD. EF 75 %. He had COVID in January of 2021 and was in [facility]. \par \par His COPD remains stable. He is to continue with Symbicort and albuterol as needed. Cannot use LAMA. Also has albuterol as needed via nebulizer. To maintain on oxygen as well. \par \par Follow up in 3 months. Sooner if necessary. \par \par Pulmonary rehabilitation is a consideration but he is still working. Weight loss and exercise were advised. \par \par Follow up in 6 months.

## 2021-06-03 NOTE — HISTORY OF PRESENT ILLNESS
[Former] : former [TextBox_4] : The patient came for a follow up visit today. \par \par He continues to work. Using oxygen 24 hr /day. \par \par He had COVID in January 2021. Was in [facility]. \par \par Recieved the COVID 19 vaccine once. To get second vaccination today. \par  [FreeTextEntry1] : \par

## 2021-06-03 NOTE — REVIEW OF SYSTEMS
[Dyspnea] : dyspnea [Hypertension] : ~T hypertension [Snoring] : snoring [Fever] : no fever [Chills] : no chills [Fatigue] : no fatigue [Cough] : no cough [Hemoptysis] : no hemoptysis [Chest Tightness] : no chest tightness [Wheezing] : no wheezing [Chest Discomfort] : no chest discomfort [Edema] : ~T edema was not present [Heartburn] : no heartburn [Rash] : no [unfilled] rash [Depression] : no depression [Diabetes] : no diabetes mellitus [Thyroid Problem] : no thyroid problem [DVT] : no DVT [Pulmonary Embolism] : no pulmonary embolism

## 2021-09-02 ENCOUNTER — APPOINTMENT (OUTPATIENT)
Dept: PULMONOLOGY | Facility: CLINIC | Age: 61
End: 2021-09-02
Payer: COMMERCIAL

## 2021-09-02 PROCEDURE — 99213 OFFICE O/P EST LOW 20 MIN: CPT

## 2021-09-03 VITALS
DIASTOLIC BLOOD PRESSURE: 88 MMHG | TEMPERATURE: 97.8 F | WEIGHT: 268 LBS | RESPIRATION RATE: 16 BRPM | OXYGEN SATURATION: 96 % | BODY MASS INDEX: 39.58 KG/M2 | SYSTOLIC BLOOD PRESSURE: 146 MMHG | HEART RATE: 70 BPM

## 2021-09-03 NOTE — REVIEW OF SYSTEMS
[Dyspnea] : dyspnea [Hypertension] : ~T hypertension [Snoring] : snoring [Fever] : no fever [Fatigue] : no fatigue [Cough] : no cough [Hemoptysis] : no hemoptysis [Chest Tightness] : no chest tightness [Wheezing] : no wheezing [Chest Discomfort] : no chest discomfort [Edema] : ~T edema was not present [Heartburn] : no heartburn [Rash] : no [unfilled] rash [Depression] : no depression [Diabetes] : no diabetes mellitus [Thyroid Problem] : no thyroid problem [DVT] : no DVT [Pulmonary Embolism] : no pulmonary embolism

## 2021-09-03 NOTE — DISCUSSION/SUMMARY
[FreeTextEntry1] : He is a 60 year-old former smoker who has severe oxygen dependent COPD and pulmonary fibrosis. He has been using a LABA/ICS combination, Symbicort, with albuterol as needed. He is not able to use a LAMA due to glaucoma, was tried in the past. He has been hospitalized three times in 2019 for exacerbations of COPD. Evaluation for lung transplant was suggested in the past. He chose not to proceed with this. Cardiac cath 6/25/19 - no CAD. EF 75 %. \par \par His COPD remains stable. He is considering disability now. \par \par He is to continue with Symbicort and albuterol as needed. Cannot use LAMA. Also has albuterol as needed via nebulizer. \par \par Follow up in 3 - 6 months. \par

## 2021-09-03 NOTE — HISTORY OF PRESENT ILLNESS
[Former] : former [TextBox_4] : The patient came for a follow up visit today. \par \par Using oxygen 24 hr /day. \par \par Has GERBER as before. \par \par No chronic cough and chest congestion. \par \par He is still working. \par \par \par  [FreeTextEntry1] : \par

## 2021-09-16 RX ORDER — ALBUTEROL SULFATE 90 UG/1
108 (90 BASE) AEROSOL, METERED RESPIRATORY (INHALATION) EVERY 6 HOURS
Qty: 1 | Refills: 5 | Status: DISCONTINUED | COMMUNITY
Start: 2017-04-24 | End: 2021-09-16

## 2021-09-16 RX ORDER — BUDESONIDE AND FORMOTEROL FUMARATE DIHYDRATE 160; 4.5 UG/1; UG/1
160-4.5 AEROSOL RESPIRATORY (INHALATION)
Qty: 1 | Refills: 5 | Status: DISCONTINUED | COMMUNITY
Start: 2019-02-11 | End: 2021-09-16

## 2021-12-02 ENCOUNTER — APPOINTMENT (OUTPATIENT)
Dept: PULMONOLOGY | Facility: CLINIC | Age: 61
End: 2021-12-02

## 2021-12-20 ENCOUNTER — RX RENEWAL (OUTPATIENT)
Age: 61
End: 2021-12-20

## 2021-12-30 ENCOUNTER — APPOINTMENT (OUTPATIENT)
Dept: PULMONOLOGY | Facility: CLINIC | Age: 61
End: 2021-12-30
Payer: COMMERCIAL

## 2021-12-30 VITALS
TEMPERATURE: 97.8 F | SYSTOLIC BLOOD PRESSURE: 130 MMHG | HEART RATE: 64 BPM | WEIGHT: 268 LBS | BODY MASS INDEX: 39.58 KG/M2 | OXYGEN SATURATION: 95 % | DIASTOLIC BLOOD PRESSURE: 90 MMHG

## 2021-12-30 PROCEDURE — 99214 OFFICE O/P EST MOD 30 MIN: CPT

## 2021-12-30 NOTE — DISCUSSION/SUMMARY
[FreeTextEntry1] : He is a 61 year-old former smoker with severe oxygen dependent COPD and pulmonary fibrosis. He has been using a LABA/ICS combination, Symbicort, with albuterol as needed. He is not able to use a LAMA due to glaucoma.Was hospitalized three times in 2019 for exacerbations of COPD. Evaluation for lung transplant was suggested however he declined. Cardiac cath 6/25/19: No CAD. EF 75 %. \par \par His COPD remains stable. To continue with oxygen 24 hours per day, Symbicort and albuterol as needed. Cannot use LAMA. Also has albuterol as needed via nebulizer. Pulmonary rehab and transplant advised in the past, he declined.  \par \par Paper work filled out. \par \par Follow up in 3 months. Sooner if necessary. \par

## 2021-12-30 NOTE — PROCEDURE
[FreeTextEntry1] : PFT 10/28/16: Moderate obstructive lung disease was noted. Air trapping was present. Diffusion was moderately reduced. No significant improvement noted after inhalation of bronchodilator.\par \par PFT 2/19/18: Severe obstructive lung disease. Mild improvement noted after inhalation of bronchodilator. Air trapping was noted. Diffusion was markedly reduced.\par \par PFT 3/22/19: Severe obstructive lung disease noted. FEV 1 was 0.98 L.  FEV1/FVC was 52 % of predicted. Air trapping was also present. Diffusion was moderately reduced. Mild improvement noted after inhalation of bronchodilator.\par \par 6 MWT: A six minute walk, exercise test, was attempted. After 2 minutes of ambulation pulse oximetry decreased to 80% on room air. The test was terminated. After another three minutes of rest pulse oximetry improved up to 94% on room air. Maximal heart rate was 104 per minute.\par \par CT Chest 10/15/18: Moderate pulmonary fibrosis was noted.Traction bronchiectasis and mild honeycombing was also present. No lymphadenopathy reported.\par \par CT Chest 3/11/19: Ground glass opacities were noted in both lungs and most pronounced in the right middle and lower lobes. Nodular densities were also noted. The largest of which measured at 1.3 cm in the right upper lobe. Moderate emphysematous changes and fibrosis also noted.\par \par CT Chest 6/22/19: Moderate pulmonary fibrosis. No significant change from November 19, 2016. Probable reactive adenopathy noted. No change from prior. \par \par CT Chest 2/9/21: Moderate emphysema with interstitial lung disease. \par

## 2021-12-30 NOTE — HISTORY OF PRESENT ILLNESS
[Former] : former [TextBox_4] : He continues to work. Uses oxygen 24 hours per day. \par \par Has GERBER as before. \par \par Fully vaccinated for COVID 19 including a booster. \par \par \par  [Nonrestorative Sleep] : denies nonrestorative sleep [Tired while Driving] : not tired while driving [FreeTextEntry1] : \par

## 2021-12-30 NOTE — REVIEW OF SYSTEMS
[Dyspnea] : dyspnea [Hypertension] : ~T hypertension [Fatigue] : no fatigue [Postnasal Drip] : no postnasal drip [Cough] : no cough [Hemoptysis] : no hemoptysis [Chest Tightness] : no chest tightness [Wheezing] : no wheezing [Edema] : ~T edema was not present [Heartburn] : no heartburn [Depression] : no depression [Rash] : no [unfilled] rash [Diabetes] : no diabetes mellitus [Thyroid Problem] : no thyroid problem [DVT] : no DVT [Pulmonary Embolism] : no pulmonary embolism

## 2022-03-18 ENCOUNTER — APPOINTMENT (OUTPATIENT)
Dept: PULMONOLOGY | Facility: CLINIC | Age: 62
End: 2022-03-18
Payer: COMMERCIAL

## 2022-03-18 VITALS
OXYGEN SATURATION: 96 % | HEART RATE: 68 BPM | SYSTOLIC BLOOD PRESSURE: 120 MMHG | TEMPERATURE: 97.1 F | DIASTOLIC BLOOD PRESSURE: 88 MMHG | WEIGHT: 255 LBS | BODY MASS INDEX: 37.66 KG/M2

## 2022-03-18 DIAGNOSIS — R06.00 DYSPNEA, UNSPECIFIED: ICD-10-CM

## 2022-03-18 PROCEDURE — 94060 EVALUATION OF WHEEZING: CPT

## 2022-03-18 PROCEDURE — 94618 PULMONARY STRESS TESTING: CPT

## 2022-03-18 PROCEDURE — 99215 OFFICE O/P EST HI 40 MIN: CPT | Mod: 25

## 2022-04-04 NOTE — PROCEDURE
[FreeTextEntry1] : PFT 10/28/16: Moderate obstructive lung disease was noted. Air trapping was present. Diffusion was moderately reduced. No significant improvement noted after inhalation of bronchodilator.\par \par PFT 2/19/18: Severe obstructive lung disease. Mild improvement noted after inhalation of bronchodilator. Air trapping was noted. Diffusion was markedly reduced.\par \par PFT 3/22/19: Severe obstructive lung disease noted. FEV1/FVC was 52 %.  Air trapping was also present. Diffusion was moderately reduced. Mild improvement noted after inhalation of bronchodilator.\par \par PFT 3/18/22: Severe obstruction. FEV/FVC 54%. \par \par ==============================================\par \par 6 MWT 3/22/19: 6 minute walk attempted. After 2 minutes of ambulation pulse oximetry decreased to 80% on room air. The test was terminated. After another three minutes of rest pulse oximetry improved up to 94% on room air. Maximal heart rate was 104 per minute.\par \par 6 MWT 3/18/22: 6 minute walk attempted. After 2 minutes of walking pulse oximetry decreased to 82% and the study had to be terminated.\par \par =============================================\par \par CT Chest 10/15/18: Moderate pulmonary fibrosis was noted.Traction bronchiectasis and mild honeycombing was also present. No lymphadenopathy reported.\par \par CT Chest 3/11/19: Ground glass opacities were noted in both lungs and most pronounced in the right middle and lower lobes. Nodular densities were also noted. The largest of which measured at 1.3 cm in the right upper lobe. Moderate emphysematous changes and fibrosis also noted.\par \par CT Chest 6/22/19: Moderate pulmonary fibrosis. No significant change from November 19, 2016. Probable reactive adenopathy noted. No change from prior. \par \par CT Chest 2/9/21: No PE. Moderate emphysema with interstitial lung disease was present. Honeycombing was noted.\par

## 2022-04-04 NOTE — DISCUSSION/SUMMARY
[FreeTextEntry1] : He is a 61 year-old former smoker with a history of hypertension, hyperlipidemia, glaucoma, very severe oxygen dependent COPD with pulmonary fibrosis. He has been using the LABA/ICS combination, Symbicort, with albuterol as needed. Unable to use a LAMA due to glaucoma.Was hospitalized three times in 2019 for exacerbations of COPD. Evaluation for lung transplant was suggested however he declined. Cardiac cath 6/25/19: No CAD. EF 75 %.  He was exposed to the [place] on 9[place]11/01 and this may have contributed to his lung problem. \par \par His very severe oxygen dependent COPD is stable at this time. He continues to work and wants to continue working. He was advised to continue with oxygen 24 hours per day, Symbicort and albuterol as needed. Cannot use LAMA due to glaucoma. Also has albuterol as needed via nebulizer. Pulmonary rehab and transplant advised in the past but he has declined. \par \par Follow up in 3 months. Sooner if necessary. \par

## 2022-04-04 NOTE — REVIEW OF SYSTEMS
[Dyspnea] : dyspnea [Hypertension] : ~T hypertension [Fever] : no fever [Fatigue] : no fatigue [Postnasal Drip] : no postnasal drip [Cough] : no cough [Hemoptysis] : no hemoptysis [Chest Tightness] : no chest tightness [Wheezing] : no wheezing [Edema] : ~T edema was not present [Heartburn] : no heartburn [Rash] : no [unfilled] rash [Depression] : no depression [Diabetes] : no diabetes mellitus [Thyroid Problem] : no thyroid problem [DVT] : no DVT [Pulmonary Embolism] : no pulmonary embolism

## 2022-04-04 NOTE — HISTORY OF PRESENT ILLNESS
[Former] : former [Difficulty Maintaining Sleep] : difficulty maintaining sleep [TextBox_4] : The patient came for a scheduled follow up visit today. \par \par He continues to work. Uses oxygen 24 hours per day. \par \par Has GERBER as before. No chest pain, pressure or palpitations. No fever, chills or sweats. No sick contacts. \par \par Fully vaccinated for COVID 19 including a booster.  [Nonrestorative Sleep] : denies nonrestorative sleep [Tired while Driving] : not tired while driving [FreeTextEntry1] : \par

## 2022-05-09 ENCOUNTER — RX RENEWAL (OUTPATIENT)
Age: 62
End: 2022-05-09

## 2022-07-15 ENCOUNTER — APPOINTMENT (OUTPATIENT)
Dept: PULMONOLOGY | Facility: CLINIC | Age: 62
End: 2022-07-15

## 2022-07-15 ENCOUNTER — NON-APPOINTMENT (OUTPATIENT)
Age: 62
End: 2022-07-15

## 2022-07-15 VITALS
OXYGEN SATURATION: 98 % | BODY MASS INDEX: 36.48 KG/M2 | DIASTOLIC BLOOD PRESSURE: 80 MMHG | TEMPERATURE: 97.3 F | SYSTOLIC BLOOD PRESSURE: 134 MMHG | HEART RATE: 49 BPM | WEIGHT: 247 LBS

## 2022-07-15 VITALS — HEART RATE: 51 BPM | OXYGEN SATURATION: 95 %

## 2022-07-15 DIAGNOSIS — R00.1 BRADYCARDIA, UNSPECIFIED: ICD-10-CM

## 2022-07-15 PROCEDURE — 93000 ELECTROCARDIOGRAM COMPLETE: CPT

## 2022-07-15 PROCEDURE — 99215 OFFICE O/P EST HI 40 MIN: CPT | Mod: 25

## 2022-07-16 PROBLEM — R00.1 BRADYCARDIA: Status: ACTIVE | Noted: 2022-07-16

## 2022-07-16 NOTE — HISTORY OF PRESENT ILLNESS
[Former] : former [Difficulty Maintaining Sleep] : difficulty maintaining sleep [TextBox_4] : The patient came for a scheduled follow up visit today. \par \par He continues to work. Uses oxygen 24 hours per day. \par \par Has felt dizzy on occasion. No falling or LOC. \par \par \par \par \par  [Nonrestorative Sleep] : denies nonrestorative sleep [Tired while Driving] : not tired while driving [FreeTextEntry1] : \par

## 2022-07-16 NOTE — PHYSICAL EXAM
[General Appearance - In No Acute Distress] : no acute distress [III] : III [Neck Cervical Mass (___cm)] : no neck mass was observed [Heart Sounds] : normal S1 and S2 [Murmurs] : no murmurs present [Edema] : no peripheral edema present [Auscultation Breath Sounds / Voice Sounds] : lungs were clear to auscultation bilaterally [] : no hepato-splenomegaly [Abnormal Walk] : normal gait [Cyanosis, Localized] : no localized cyanosis [3+ Pitting] : 3+  pitting  [Skin Turgor] : normal skin turgor [No Focal Deficits] : no focal deficits [Oriented To Time, Place, And Person] : oriented to person, place, and time [Affect] : the affect was normal [Well Groomed] : well groomed [FreeTextEntry1] : Obese abdomen

## 2022-07-16 NOTE — DISCUSSION/SUMMARY
[FreeTextEntry1] : He is a 61 year-old former smoker with a history of hypertension, hyperlipidemia, glaucoma, very severe oxygen dependent COPD with pulmonary fibrosis. He has been using the LABA/ICS combination, Symbicort, with albuterol as needed. Unable to use a LAMA due to glaucoma.Was hospitalized three times in 2019 for exacerbations of COPD. Evaluation for lung transplant was suggested however he declined. Cardiac cath 6/25/19: No CAD. EF 75 %.  He was exposed to the [place] on 9/11/01 and this may have contributed to his lung problem. \par \par He was noted to be bradycardic on examination.  EKG showed sinus bradycardia.  His medication list was reviewed.  He is on diltiazem which may cause bradycardia.  He was advised to speak to his primary doctor as to whether or not this medicine can be decreased and an alternative used for blood pressure.\par \par His COPD is stable at this time. He continues to work and wants to continue working. To continue with oxygen 24 hours per day, Symbicort and albuterol as needed. Cannot use LAMA due to glaucoma. Also has albuterol as needed via nebulizer.\par \par Follow up in 3 months. Sooner if necessary. \par \par \par Time spent interviewing the patient, performing an examination, reviewing the data and discussing the findings and recommendations with the patient and completing the documentation was 55 min.

## 2022-07-16 NOTE — REVIEW OF SYSTEMS
[Dyspnea] : dyspnea [Hypertension] : ~T hypertension [Dizziness] : dizziness [Fatigue] : no fatigue [Postnasal Drip] : no postnasal drip [Cough] : no cough [Hemoptysis] : no hemoptysis [Chest Tightness] : no chest tightness [Wheezing] : no wheezing [Palpitations] : no palpitations [Edema] : ~T edema was not present [Heartburn] : no heartburn [Rash] : no [unfilled] rash [Syncope] : no fainting [Depression] : no depression [Diabetes] : no diabetes mellitus [Thyroid Problem] : no thyroid problem [DVT] : no DVT [Pulmonary Embolism] : no pulmonary embolism

## 2022-10-28 ENCOUNTER — RX RENEWAL (OUTPATIENT)
Age: 62
End: 2022-10-28

## 2022-11-04 ENCOUNTER — APPOINTMENT (OUTPATIENT)
Dept: PULMONOLOGY | Facility: CLINIC | Age: 62
End: 2022-11-04

## 2022-11-29 ENCOUNTER — RX RENEWAL (OUTPATIENT)
Age: 62
End: 2022-11-29

## 2022-12-27 ENCOUNTER — RX RENEWAL (OUTPATIENT)
Age: 62
End: 2022-12-27

## 2022-12-29 ENCOUNTER — RX RENEWAL (OUTPATIENT)
Age: 62
End: 2022-12-29

## 2023-01-26 ENCOUNTER — APPOINTMENT (OUTPATIENT)
Dept: PULMONOLOGY | Facility: CLINIC | Age: 63
End: 2023-01-26
Payer: COMMERCIAL

## 2023-01-26 VITALS
HEART RATE: 72 BPM | WEIGHT: 255 LBS | BODY MASS INDEX: 37.66 KG/M2 | OXYGEN SATURATION: 92 % | DIASTOLIC BLOOD PRESSURE: 84 MMHG | SYSTOLIC BLOOD PRESSURE: 126 MMHG | TEMPERATURE: 97.3 F

## 2023-01-26 VITALS — OXYGEN SATURATION: 98 %

## 2023-01-26 PROCEDURE — 99214 OFFICE O/P EST MOD 30 MIN: CPT

## 2023-01-26 RX ORDER — BUDESONIDE 0.5 MG/2ML
0.5 INHALANT ORAL
Qty: 2 | Refills: 5 | Status: ACTIVE | COMMUNITY
Start: 2020-08-11 | End: 1900-01-01

## 2023-01-26 RX ORDER — DILTIAZEM HYDROCHLORIDE 120 MG/1
120 CAPSULE, EXTENDED RELEASE ORAL DAILY
Qty: 1 | Refills: 1 | Status: DISCONTINUED | COMMUNITY
Start: 1900-01-01 | End: 2023-01-26

## 2023-01-26 RX ORDER — ALBUTEROL SULFATE 90 UG/1
108 (90 BASE) AEROSOL, METERED RESPIRATORY (INHALATION) EVERY 6 HOURS
Qty: 1 | Refills: 5 | Status: DISCONTINUED | COMMUNITY
Start: 2021-09-23 | End: 2023-01-26

## 2023-01-26 NOTE — REVIEW OF SYSTEMS
[Dyspnea] : dyspnea [Hypertension] : ~T hypertension [Dizziness] : dizziness [Fever] : no fever [Fatigue] : no fatigue [Postnasal Drip] : no postnasal drip [Cough] : no cough [Hemoptysis] : no hemoptysis [Chest Tightness] : no chest tightness [Wheezing] : no wheezing [Palpitations] : no palpitations [Edema] : ~T edema was not present [Heartburn] : no heartburn [Rash] : no [unfilled] rash [Syncope] : no fainting [Depression] : no depression [Diabetes] : no diabetes mellitus [Thyroid Problem] : no thyroid problem [DVT] : no DVT [Pulmonary Embolism] : no pulmonary embolism

## 2023-01-26 NOTE — DISCUSSION/SUMMARY
[FreeTextEntry1] : He is a 62 year-old former smoker with a history of hypertension, hyperlipidemia, glaucoma, very severe oxygen dependent COPD with pulmonary fibrosis. He has been using the LABA/ICS combination, Symbicort, with albuterol as needed. Unable to use a LAMA due to glaucoma.Was hospitalized three times in 2019 for exacerbations of COPD. Evaluation for lung transplant was suggested however he declined. Cardiac cath 6/25/19: No CAD. EF 75 %.  He was exposed to the [place] on 9/11/01 and this may have contributed to his lung problem. \par \par His COPD is stable at this time. He continues to work and wants to continue working. To continue with oxygen 24 hours per day, Symbicort and albuterol as needed. Cannot use LAMA due to glaucoma. Also has albuterol as needed via nebulizer. Weight loss would be helpful. \par \par Follow up in 6 months. Sooner if necessary.

## 2023-01-26 NOTE — HISTORY OF PRESENT ILLNESS
[Difficulty Maintaining Sleep] : difficulty maintaining sleep [Former] : former [Never] : never [TextBox_4] : The patient came for a scheduled follow up visit today. \par \par He continues to work and remains on oxygen 24 hours per day. \par \par \par \par \par  [Nonrestorative Sleep] : denies nonrestorative sleep [Tired while Driving] : not tired while driving [FreeTextEntry1] : \par

## 2023-01-26 NOTE — PHYSICAL EXAM
[Well Groomed] : well groomed [General Appearance - In No Acute Distress] : no acute distress [III] : III [Neck Cervical Mass (___cm)] : no neck mass was observed [Heart Sounds] : normal S1 and S2 [Murmurs] : no murmurs present [Edema] : no peripheral edema present [Auscultation Breath Sounds / Voice Sounds] : lungs were clear to auscultation bilaterally [Abnormal Walk] : normal gait [Cyanosis, Localized] : no localized cyanosis [3+ Pitting] : 3+  pitting  [Skin Turgor] : normal skin turgor [No Focal Deficits] : no focal deficits [Oriented To Time, Place, And Person] : oriented to person, place, and time [Affect] : the affect was normal [Heart Rate And Rhythm] : heart rate and rhythm were normal [] : no respiratory distress [Abdomen Tenderness] : non-tender [FreeTextEntry1] : Obese abdomen

## 2023-07-31 ENCOUNTER — APPOINTMENT (OUTPATIENT)
Dept: PULMONOLOGY | Facility: CLINIC | Age: 63
End: 2023-07-31
Payer: COMMERCIAL

## 2023-07-31 VITALS
SYSTOLIC BLOOD PRESSURE: 116 MMHG | TEMPERATURE: 96.4 F | OXYGEN SATURATION: 90 % | DIASTOLIC BLOOD PRESSURE: 70 MMHG | HEART RATE: 81 BPM

## 2023-07-31 VITALS — HEART RATE: 75 BPM | OXYGEN SATURATION: 98 %

## 2023-07-31 VITALS — BODY MASS INDEX: 37.95 KG/M2 | WEIGHT: 257 LBS

## 2023-07-31 DIAGNOSIS — R06.02 SHORTNESS OF BREATH: ICD-10-CM

## 2023-07-31 PROCEDURE — 99214 OFFICE O/P EST MOD 30 MIN: CPT

## 2023-07-31 NOTE — REVIEW OF SYSTEMS
[Fever] : no fever [Fatigue] : no fatigue [Postnasal Drip] : no postnasal drip [Cough] : no cough [Sputum] : not coughing up ~M sputum [Hemoptysis] : no hemoptysis [Dyspnea] : dyspnea [Chest Tightness] : no chest tightness [Wheezing] : no wheezing [Hypertension] : ~T hypertension [Palpitations] : no palpitations [Edema] : ~T edema was not present [Heartburn] : no heartburn [Rash] : no [unfilled] rash [Dizziness] : dizziness [Syncope] : no fainting [Depression] : no depression [Diabetes] : no diabetes mellitus [Thyroid Problem] : no thyroid problem [DVT] : no DVT [Pulmonary Embolism] : no pulmonary embolism

## 2023-07-31 NOTE — HISTORY OF PRESENT ILLNESS
[Former] : former [Never] : never [TextBox_4] : The patient came for a scheduled follow up visit today.   He continues to work. On oxygen 24 hours per day.   C/O GERBER as before. No complaint of chest pain or pressure.       [Awakes Unrefreshed] : does not awaken unrefreshed [Difficulty Maintaining Sleep] : difficulty maintaining sleep [Nonrestorative Sleep] : denies nonrestorative sleep [Tired while Driving] : not tired while driving [FreeTextEntry1] : \par

## 2023-07-31 NOTE — DISCUSSION/SUMMARY
[FreeTextEntry1] : He is a 62 year-old former smoker with a history of hypertension, hyperlipidemia, glaucoma, very severe oxygen dependent COPD with pulmonary fibrosis. He has been using the LABA/ICS combination, Symbicort, with albuterol as needed. Unable to use a LAMA due to glaucoma.Was hospitalized three times in 2019 for exacerbations of COPD. Evaluation for lung transplant was suggested however he declined. Cardiac cath 6/25/19: No CAD. EF 75 %.  He was exposed to the [place] on 9[place]/01 and this may have contributed to his lung problem.   Impression:  Severe oxygen dependent COPD  - stable  Recommend:   Continue with oxygen 24 hours per day Symbicort and albuterol as needed. - cCannot use LAMA due to glaucoma.  Albuterol as needed via nebulizer CT of the Chest  Follow up in 6 months. Sooner if necessary.

## 2023-07-31 NOTE — PHYSICAL EXAM
[Well Groomed] : well groomed [General Appearance - In No Acute Distress] : no acute distress [III] : III [Neck Cervical Mass (___cm)] : no neck mass was observed [Heart Rate And Rhythm] : heart rate and rhythm were normal [Heart Sounds] : normal S1 and S2 [Murmurs] : no murmurs present [Edema] : no peripheral edema present [Auscultation Breath Sounds / Voice Sounds] : lungs were clear to auscultation bilaterally [Abdomen Tenderness] : non-tender [] : no hepato-splenomegaly [FreeTextEntry1] : Obese abdomen [Abnormal Walk] : normal gait [Cyanosis, Localized] : no localized cyanosis [3+ Pitting] : 3+  pitting  [Skin Turgor] : normal skin turgor [No Focal Deficits] : no focal deficits [Oriented To Time, Place, And Person] : oriented to person, place, and time [Affect] : the affect was normal

## 2023-08-10 ENCOUNTER — RX RENEWAL (OUTPATIENT)
Age: 63
End: 2023-08-10

## 2023-10-10 ENCOUNTER — RX RENEWAL (OUTPATIENT)
Age: 63
End: 2023-10-10

## 2023-11-10 ENCOUNTER — APPOINTMENT (OUTPATIENT)
Dept: PULMONOLOGY | Facility: CLINIC | Age: 63
End: 2023-11-10

## 2023-11-29 ENCOUNTER — RX RENEWAL (OUTPATIENT)
Age: 63
End: 2023-11-29

## 2023-12-14 ENCOUNTER — APPOINTMENT (OUTPATIENT)
Dept: PULMONOLOGY | Facility: CLINIC | Age: 63
End: 2023-12-14

## 2023-12-14 NOTE — PHYSICAL EXAM
How Severe Is Your Cyst?: moderate
Is This A New Presentation, Or A Follow-Up?: Cyst
[FreeTextEntry1] : Obese abdomen
Additional History: -\\nNew pt\\nPresents with cyst under left breast\\nPt reports cyst has been present for years but became irritated and painful 3 years ago\\nNo hx or ds milt hx of skin cancer\\nPt denies FBSE

## 2023-12-14 NOTE — HISTORY OF PRESENT ILLNESS
[TextBox_4] : The patient came for a scheduled follow up visit today.   He continues to work. On oxygen 24 hours per day.   C/O GERBER as before. No complaint of chest pain or pressure.       [Awakes Unrefreshed] : does not awaken unrefreshed [Nonrestorative Sleep] : denies nonrestorative sleep [Tired while Driving] : not tired while driving [FreeTextEntry1] : \par

## 2023-12-14 NOTE — REVIEW OF SYSTEMS
[Fever] : no fever [Fatigue] : no fatigue [Postnasal Drip] : no postnasal drip [Cough] : no cough [Sputum] : not coughing up ~M sputum [Hemoptysis] : no hemoptysis [Chest Tightness] : no chest tightness [Wheezing] : no wheezing [Palpitations] : no palpitations [Edema] : ~T edema was not present [Heartburn] : no heartburn [Rash] : no [unfilled] rash [Syncope] : no fainting [Depression] : no depression [Diabetes] : no diabetes mellitus [Thyroid Problem] : no thyroid problem [DVT] : no DVT [Pulmonary Embolism] : no pulmonary embolism

## 2023-12-15 ENCOUNTER — APPOINTMENT (OUTPATIENT)
Dept: PULMONOLOGY | Facility: CLINIC | Age: 63
End: 2023-12-15
Payer: MEDICARE

## 2023-12-15 VITALS
TEMPERATURE: 97.3 F | SYSTOLIC BLOOD PRESSURE: 110 MMHG | HEART RATE: 77 BPM | BODY MASS INDEX: 38.1 KG/M2 | DIASTOLIC BLOOD PRESSURE: 72 MMHG | OXYGEN SATURATION: 89 % | WEIGHT: 258 LBS

## 2023-12-15 DIAGNOSIS — J84.10 PULMONARY FIBROSIS, UNSPECIFIED: ICD-10-CM

## 2023-12-15 DIAGNOSIS — R93.89 ABNORMAL FINDINGS ON DIAGNOSTIC IMAGING OF OTHER SPECIFIED BODY STRUCTURES: ICD-10-CM

## 2023-12-15 PROCEDURE — 99214 OFFICE O/P EST MOD 30 MIN: CPT | Mod: 25

## 2023-12-15 PROCEDURE — 94060 EVALUATION OF WHEEZING: CPT

## 2023-12-15 NOTE — PHYSICAL EXAM
[Well Groomed] : well groomed [General Appearance - In No Acute Distress] : no acute distress [Neck Cervical Mass (___cm)] : no neck mass was observed [Heart Rate And Rhythm] : heart rate and rhythm were normal [Heart Sounds] : normal S1 and S2 [Murmurs] : no murmurs present [Edema] : no peripheral edema present [Auscultation Breath Sounds / Voice Sounds] : lungs were clear to auscultation bilaterally [Abdomen Tenderness] : non-tender [] : no hepato-splenomegaly [Abnormal Walk] : normal gait [Cyanosis, Localized] : no localized cyanosis [3+ Pitting] : 3+  pitting  [Skin Turgor] : normal skin turgor [No Focal Deficits] : no focal deficits [Oriented To Time, Place, And Person] : oriented to person, place, and time [Affect] : the affect was normal [FreeTextEntry1] : Obese abdomen [Nail Clubbing] : no clubbing of the fingernails [Impaired Insight] : insight and judgment were intact

## 2023-12-15 NOTE — REVIEW OF SYSTEMS
[Dyspnea] : dyspnea [Hypertension] : ~T hypertension [Dizziness] : dizziness [Fever] : no fever [Postnasal Drip] : no postnasal drip [Cough] : no cough [Sputum] : not coughing up ~M sputum [Hemoptysis] : no hemoptysis [Chest Tightness] : no chest tightness [Wheezing] : no wheezing [PND] : no PND [Palpitations] : no palpitations [Edema] : ~T edema was not present [Heartburn] : no heartburn [Rash] : no [unfilled] rash [Syncope] : no fainting [Depression] : no depression [Diabetes] : no diabetes mellitus [Thyroid Problem] : no thyroid problem [DVT] : no DVT [Pulmonary Embolism] : no pulmonary embolism

## 2023-12-15 NOTE — PROCEDURE
[FreeTextEntry1] : PFT 10/28/16: Moderate obstructive lung disease was noted. Air trapping was present. Diffusion was moderately reduced. No significant improvement noted after inhalation of bronchodilator.  PFT 2/19/18: Severe obstructive lung disease. Mild improvement noted after inhalation of bronchodilator. Air trapping was noted. Diffusion was markedly reduced.  PFT 3/22/19: Severe obstructive lung disease noted. FEV1/FVC was 52 %.  Air trapping was also present. Diffusion was moderately reduced. Mild improvement noted after inhalation of bronchodilator.  PFT 3/18/22: Severe obstruction. FEV/FVC 54%.   PFT 12/15/23: Moderate obstruction. FEV 1 = 1.34 L, improved from prior study. FEV1/FVC = 63%.   ==============================================  6 MWT 3/22/19: 6 minute walk attempted. After 2 minutes of ambulation pulse oximetry decreased to 80% on room air. The test was terminated. After another three minutes of rest pulse oximetry improved up to 94% on room air. Maximal heart rate was 104 per minute.  6 MWT 3/18/22: 6 minute walk attempted. After 2 minutes of walking pulse oximetry decreased to 82% and the study had to be terminated.  =============================================  CT Chest 10/15/18: Moderate pulmonary fibrosis was noted.Traction bronchiectasis and mild honeycombing was also present. No lymphadenopathy reported.  CT Chest 3/11/19: Ground glass opacities were noted in both lungs and most pronounced in the right middle and lower lobes. Nodular densities were also noted. The largest of which measured at 1.3 cm in the right upper lobe. Moderate emphysematous changes and fibrosis also noted.  CT Chest 6/22/19: Moderate pulmonary fibrosis. No significant change from November 19, 2016. Probable reactive adenopathy noted. No change from prior.   CT Chest 2/9/21: No PE. Moderate emphysema with interstitial lung disease was present. Honeycombing was noted.  CT Chest 8/26/23: Nonspecific interstitial lung disease, stable.  Moderate emphysematous changes, stable.  Mediastinal adenopathy, decreased in size when compared to prior examination.  Chest x-ray 11/5/23: Reticular infiltrates consistent with fibrosis.

## 2023-12-15 NOTE — DISCUSSION/SUMMARY
[FreeTextEntry1] : He is a 63 year-old former smoker with a history of hypertension, hyperlipidemia, glaucoma, very severe oxygen dependent COPD with pulmonary fibrosis. He has been using the LABA/ICS combination, Symbicort, with albuterol as needed. Unable to use a LAMA due to glaucoma. He was hospitalized three times in 2019 for exacerbations of COPD. Evaluation for lung transplant was suggested however he declined. Cardiac cath 6/25/19: No CAD. EF 75 %.  He was exposed to the [place] on 9[place]/01 and this may have contributed to his lung problem.   Impression:  Moderate COPD stable -Symptoms are controlled with Symbicort and albuterol Uses oxygen for exercise and at night   Recommend:  Continue with oxygen Symbicort and albuterol as needed Albuterol as needed via nebulizer  He is stable for colonoscopy which should be performed with anesthesiology present  Follow up in 6 months

## 2023-12-15 NOTE — HISTORY OF PRESENT ILLNESS
[Former] : former [Never] : never [TextBox_4] : He is planning on having a colonoscopy next week.  He has dyspnea on exertion as before.  Uses Symbicort and albuterol as needed.  Also uses supplemental oxygen as needed.  He has a portable oxygen system. [Awakes Unrefreshed] : does not awaken unrefreshed [Difficulty Maintaining Sleep] : does not have difficulty maintaining sleep [Nonrestorative Sleep] : denies nonrestorative sleep [Tired while Driving] : not tired while driving [FreeTextEntry1] : \par

## 2024-01-29 ENCOUNTER — RX RENEWAL (OUTPATIENT)
Age: 64
End: 2024-01-29

## 2024-03-07 ENCOUNTER — RX RENEWAL (OUTPATIENT)
Age: 64
End: 2024-03-07

## 2024-03-07 RX ORDER — LANCING DEVICE
EACH MISCELLANEOUS
Qty: 1 | Refills: 0 | Status: ACTIVE | COMMUNITY
Start: 2024-03-07 | End: 1900-01-01

## 2024-03-18 ENCOUNTER — RX RENEWAL (OUTPATIENT)
Age: 64
End: 2024-03-18

## 2024-03-18 RX ORDER — ALBUTEROL SULFATE 90 UG/1
108 (90 BASE) INHALANT RESPIRATORY (INHALATION)
Qty: 8.5 | Refills: 2 | Status: ACTIVE | COMMUNITY
Start: 2021-09-16 | End: 1900-01-01

## 2024-04-26 ENCOUNTER — RX RENEWAL (OUTPATIENT)
Age: 64
End: 2024-04-26

## 2024-04-26 RX ORDER — BUDESONIDE AND FORMOTEROL FUMARATE DIHYDRATE 160; 4.5 UG/1; UG/1
160-4.5 AEROSOL RESPIRATORY (INHALATION)
Qty: 10.2 | Refills: 1 | Status: ACTIVE | COMMUNITY
Start: 2021-02-19 | End: 1900-01-01

## 2024-06-20 ENCOUNTER — APPOINTMENT (OUTPATIENT)
Dept: PULMONOLOGY | Facility: CLINIC | Age: 64
End: 2024-06-20
Payer: MEDICARE

## 2024-06-20 VITALS
WEIGHT: 265 LBS | SYSTOLIC BLOOD PRESSURE: 152 MMHG | TEMPERATURE: 98.6 F | HEART RATE: 64 BPM | OXYGEN SATURATION: 96 % | BODY MASS INDEX: 39.13 KG/M2 | DIASTOLIC BLOOD PRESSURE: 92 MMHG

## 2024-06-20 DIAGNOSIS — Z99.81 DEPENDENCE ON SUPPLEMENTAL OXYGEN: ICD-10-CM

## 2024-06-20 DIAGNOSIS — J44.9 CHRONIC OBSTRUCTIVE PULMONARY DISEASE, UNSPECIFIED: ICD-10-CM

## 2024-06-20 PROCEDURE — 99214 OFFICE O/P EST MOD 30 MIN: CPT

## 2024-06-20 PROCEDURE — 99204 OFFICE O/P NEW MOD 45 MIN: CPT

## 2024-06-20 RX ORDER — PREDNISONE 20 MG/1
20 TABLET ORAL DAILY
Qty: 10 | Refills: 0 | Status: ACTIVE | COMMUNITY
Start: 2024-06-20 | End: 1900-01-01

## 2024-06-20 NOTE — HISTORY OF PRESENT ILLNESS
[Former] : former [Never] : never [TextBox_4] : He came for follow-up today.  He denied any dyspnea at rest.  Denied any chest pain, pressure or palpitations.  He had a colonoscopy with sedation and did not have any problems.  He remains on Symbicort, albuterol and oxygen therapy.  He does not use the oxygen when he is at rest.  He uses it at night and with exertion.  He has been having bilateral hip pain and will be seeing an orthopedist in the near future.  He stated that he may need to have surgery. [Awakes Unrefreshed] : does not awaken unrefreshed [Nonrestorative Sleep] : denies nonrestorative sleep [Tired while Driving] : not tired while driving [FreeTextEntry1] : \par

## 2024-06-20 NOTE — PHYSICAL EXAM
[Well Groomed] : well groomed [General Appearance - In No Acute Distress] : no acute distress [Neck Cervical Mass (___cm)] : no neck mass was observed [Heart Rate And Rhythm] : heart rate and rhythm were normal [Heart Sounds] : normal S1 and S2 [Murmurs] : no murmurs present [Edema] : no peripheral edema present [Auscultation Breath Sounds / Voice Sounds] : lungs were clear to auscultation bilaterally [Abdomen Tenderness] : non-tender [] : no hepato-splenomegaly [Abnormal Walk] : normal gait [Nail Clubbing] : no clubbing of the fingernails [Cyanosis, Localized] : no localized cyanosis [3+ Pitting] : 3+  pitting  [Skin Turgor] : normal skin turgor [No Focal Deficits] : no focal deficits [Oriented To Time, Place, And Person] : oriented to person, place, and time [Impaired Insight] : insight and judgment were intact [Affect] : the affect was normal [Respiration, Rhythm And Depth] : normal respiratory rhythm and effort [Exaggerated Use Of Accessory Muscles For Inspiration] : no accessory muscle use [FreeTextEntry1] : Obese abdomen

## 2024-06-20 NOTE — REVIEW OF SYSTEMS
[Hypertension] : ~T hypertension [Dizziness] : dizziness [Fever] : no fever [Fatigue] : no fatigue [Postnasal Drip] : no postnasal drip [Cough] : no cough [Hemoptysis] : no hemoptysis [Chest Tightness] : no chest tightness [Wheezing] : no wheezing [PND] : no PND [Palpitations] : no palpitations [Edema] : ~T edema was not present [Heartburn] : no heartburn [Rash] : no [unfilled] rash [Syncope] : no fainting [Depression] : no depression [Diabetes] : no diabetes mellitus [Thyroid Problem] : no thyroid problem [DVT] : no DVT [Pulmonary Embolism] : no pulmonary embolism

## 2024-06-20 NOTE — DISCUSSION/SUMMARY
[FreeTextEntry1] : He is a 63 year-old former smoker with a history of hypertension, hyperlipidemia, glaucoma, very severe oxygen dependent COPD with pulmonary fibrosis. He has been using the LABA/ICS combination, Symbicort, with albuterol as needed. Unable to use a LAMA due to glaucoma. He was hospitalized three times in 2019 for exacerbations of COPD. Evaluation for lung transplant was suggested however he declined. Cardiac cath 6/25/19: No CAD. EF 75 %.  He was exposed to the [place]01 and this may have contributed to his lung problem.   Impression Moderate COPD stable -Symptoms are controlled with Symbicort and albuterol Uses oxygen for exercise and at night  Recommend Continue with oxygen Symbicort and albuterol as needed Albuterol as needed via nebulizer  His pulmonary status is at baseline and places him at a high risk for surgery and anesthesia.  Any surgical procedure would require a risk versus benefit analysis.  He is aware of this.

## 2024-07-18 ENCOUNTER — RX RENEWAL (OUTPATIENT)
Age: 64
End: 2024-07-18

## 2024-10-07 ENCOUNTER — APPOINTMENT (OUTPATIENT)
Dept: PULMONOLOGY | Facility: CLINIC | Age: 64
End: 2024-10-07
Payer: MEDICARE

## 2024-10-07 VITALS
SYSTOLIC BLOOD PRESSURE: 136 MMHG | DIASTOLIC BLOOD PRESSURE: 80 MMHG | TEMPERATURE: 96.9 F | HEART RATE: 65 BPM | BODY MASS INDEX: 37.51 KG/M2 | OXYGEN SATURATION: 96 % | WEIGHT: 254 LBS

## 2024-10-07 VITALS — OXYGEN SATURATION: 97 % | HEART RATE: 60 BPM

## 2024-10-07 DIAGNOSIS — J44.9 CHRONIC OBSTRUCTIVE PULMONARY DISEASE, UNSPECIFIED: ICD-10-CM

## 2024-10-07 PROCEDURE — 99214 OFFICE O/P EST MOD 30 MIN: CPT

## 2024-12-04 ENCOUNTER — APPOINTMENT (OUTPATIENT)
Dept: PULMONOLOGY | Facility: CLINIC | Age: 64
End: 2024-12-04

## 2024-12-18 ENCOUNTER — APPOINTMENT (OUTPATIENT)
Dept: PULMONOLOGY | Facility: CLINIC | Age: 64
End: 2024-12-18
Payer: MEDICARE

## 2024-12-18 ENCOUNTER — NON-APPOINTMENT (OUTPATIENT)
Age: 64
End: 2024-12-18

## 2024-12-18 VITALS
DIASTOLIC BLOOD PRESSURE: 80 MMHG | TEMPERATURE: 98 F | HEART RATE: 62 BPM | SYSTOLIC BLOOD PRESSURE: 138 MMHG | OXYGEN SATURATION: 94 % | BODY MASS INDEX: 37.62 KG/M2 | HEIGHT: 69 IN | WEIGHT: 254 LBS

## 2024-12-18 VITALS — OXYGEN SATURATION: 97 %

## 2024-12-18 DIAGNOSIS — J44.9 CHRONIC OBSTRUCTIVE PULMONARY DISEASE, UNSPECIFIED: ICD-10-CM

## 2024-12-18 DIAGNOSIS — R93.89 ABNORMAL FINDINGS ON DIAGNOSTIC IMAGING OF OTHER SPECIFIED BODY STRUCTURES: ICD-10-CM

## 2024-12-18 DIAGNOSIS — Z99.81 DEPENDENCE ON SUPPLEMENTAL OXYGEN: ICD-10-CM

## 2024-12-18 PROCEDURE — 99214 OFFICE O/P EST MOD 30 MIN: CPT | Mod: 25

## 2024-12-18 PROCEDURE — 94060 EVALUATION OF WHEEZING: CPT

## 2024-12-30 ENCOUNTER — RX RENEWAL (OUTPATIENT)
Age: 64
End: 2024-12-30

## 2025-01-09 ENCOUNTER — RX RENEWAL (OUTPATIENT)
Age: 65
End: 2025-01-09

## 2025-02-24 ENCOUNTER — RX RENEWAL (OUTPATIENT)
Age: 65
End: 2025-02-24

## 2025-05-21 ENCOUNTER — APPOINTMENT (OUTPATIENT)
Dept: PULMONOLOGY | Facility: CLINIC | Age: 65
End: 2025-05-21
Payer: MEDICARE

## 2025-05-21 ENCOUNTER — NON-APPOINTMENT (OUTPATIENT)
Age: 65
End: 2025-05-21

## 2025-05-21 VITALS
BODY MASS INDEX: 37.77 KG/M2 | DIASTOLIC BLOOD PRESSURE: 84 MMHG | WEIGHT: 255 LBS | TEMPERATURE: 97.6 F | HEIGHT: 69 IN | OXYGEN SATURATION: 98 % | HEART RATE: 62 BPM | SYSTOLIC BLOOD PRESSURE: 144 MMHG

## 2025-05-21 DIAGNOSIS — J44.9 CHRONIC OBSTRUCTIVE PULMONARY DISEASE, UNSPECIFIED: ICD-10-CM

## 2025-05-21 DIAGNOSIS — J84.10 PULMONARY FIBROSIS, UNSPECIFIED: ICD-10-CM

## 2025-05-21 DIAGNOSIS — Z99.81 DEPENDENCE ON SUPPLEMENTAL OXYGEN: ICD-10-CM

## 2025-05-21 DIAGNOSIS — R06.00 DYSPNEA, UNSPECIFIED: ICD-10-CM

## 2025-05-21 DIAGNOSIS — R93.89 ABNORMAL FINDINGS ON DIAGNOSTIC IMAGING OF OTHER SPECIFIED BODY STRUCTURES: ICD-10-CM

## 2025-05-21 PROCEDURE — 99215 OFFICE O/P EST HI 40 MIN: CPT

## 2025-05-22 ENCOUNTER — NON-APPOINTMENT (OUTPATIENT)
Age: 65
End: 2025-05-22

## 2025-06-25 ENCOUNTER — RX RENEWAL (OUTPATIENT)
Age: 65
End: 2025-06-25